# Patient Record
Sex: MALE | Race: OTHER | NOT HISPANIC OR LATINO | URBAN - METROPOLITAN AREA
[De-identification: names, ages, dates, MRNs, and addresses within clinical notes are randomized per-mention and may not be internally consistent; named-entity substitution may affect disease eponyms.]

---

## 2022-07-20 ENCOUNTER — INPATIENT (INPATIENT)
Facility: HOSPITAL | Age: 67
LOS: 0 days | Discharge: HOME | DRG: 314 | End: 2022-07-21
Attending: HOSPITALIST | Admitting: INTERNAL MEDICINE
Payer: MEDICAID

## 2022-07-20 VITALS
TEMPERATURE: 98 F | HEIGHT: 67 IN | OXYGEN SATURATION: 95 % | RESPIRATION RATE: 17 BRPM | HEART RATE: 61 BPM | DIASTOLIC BLOOD PRESSURE: 89 MMHG | SYSTOLIC BLOOD PRESSURE: 156 MMHG | WEIGHT: 191.8 LBS

## 2022-07-20 DIAGNOSIS — I10 ESSENTIAL (PRIMARY) HYPERTENSION: ICD-10-CM

## 2022-07-20 DIAGNOSIS — N17.9 ACUTE KIDNEY FAILURE, UNSPECIFIED: ICD-10-CM

## 2022-07-20 DIAGNOSIS — I21.4 NON-ST ELEVATION (NSTEMI) MYOCARDIAL INFARCTION: ICD-10-CM

## 2022-07-20 DIAGNOSIS — Z79.899 OTHER LONG TERM (CURRENT) DRUG THERAPY: ICD-10-CM

## 2022-07-20 DIAGNOSIS — H40.9 UNSPECIFIED GLAUCOMA: ICD-10-CM

## 2022-07-20 DIAGNOSIS — E11.9 TYPE 2 DIABETES MELLITUS WITHOUT COMPLICATIONS: ICD-10-CM

## 2022-07-20 DIAGNOSIS — U07.1 COVID-19: ICD-10-CM

## 2022-07-20 DIAGNOSIS — R07.9 CHEST PAIN, UNSPECIFIED: ICD-10-CM

## 2022-07-20 DIAGNOSIS — I25.10 ATHEROSCLEROTIC HEART DISEASE OF NATIVE CORONARY ARTERY WITHOUT ANGINA PECTORIS: ICD-10-CM

## 2022-07-20 DIAGNOSIS — Z29.9 ENCOUNTER FOR PROPHYLACTIC MEASURES, UNSPECIFIED: ICD-10-CM

## 2022-07-20 LAB
ALBUMIN SERPL ELPH-MCNC: 3.8 G/DL — SIGNIFICANT CHANGE UP (ref 3.3–5)
ALP SERPL-CCNC: 52 U/L — SIGNIFICANT CHANGE UP (ref 40–120)
ALT FLD-CCNC: 12 U/L — SIGNIFICANT CHANGE UP (ref 10–45)
ANION GAP SERPL CALC-SCNC: 9 MMOL/L — SIGNIFICANT CHANGE UP (ref 5–17)
ANION GAP SERPL CALC-SCNC: 9 MMOL/L — SIGNIFICANT CHANGE UP (ref 5–17)
AST SERPL-CCNC: 36 U/L — SIGNIFICANT CHANGE UP (ref 10–40)
BASE EXCESS BLDV CALC-SCNC: 3.3 MMOL/L — HIGH (ref -2–2)
BILIRUB SERPL-MCNC: 0.3 MG/DL — SIGNIFICANT CHANGE UP (ref 0.2–1.2)
BUN SERPL-MCNC: 19 MG/DL — SIGNIFICANT CHANGE UP (ref 7–23)
BUN SERPL-MCNC: 20 MG/DL — SIGNIFICANT CHANGE UP (ref 7–23)
CA-I SERPL-SCNC: 1.2 MMOL/L — SIGNIFICANT CHANGE UP (ref 1.15–1.33)
CALCIUM SERPL-MCNC: 8.8 MG/DL — SIGNIFICANT CHANGE UP (ref 8.4–10.5)
CALCIUM SERPL-MCNC: 9.4 MG/DL — SIGNIFICANT CHANGE UP (ref 8.4–10.5)
CHLORIDE BLDV-SCNC: 105 MMOL/L — SIGNIFICANT CHANGE UP (ref 96–108)
CHLORIDE SERPL-SCNC: 103 MMOL/L — SIGNIFICANT CHANGE UP (ref 96–108)
CHLORIDE SERPL-SCNC: 104 MMOL/L — SIGNIFICANT CHANGE UP (ref 96–108)
CO2 BLDV-SCNC: 28 MMOL/L — HIGH (ref 22–26)
CO2 SERPL-SCNC: 24 MMOL/L — SIGNIFICANT CHANGE UP (ref 22–31)
CO2 SERPL-SCNC: 24 MMOL/L — SIGNIFICANT CHANGE UP (ref 22–31)
CREAT SERPL-MCNC: 1.42 MG/DL — HIGH (ref 0.5–1.3)
CREAT SERPL-MCNC: 1.45 MG/DL — HIGH (ref 0.5–1.3)
EGFR: 52 ML/MIN/1.73M2 — LOW
EGFR: 53 ML/MIN/1.73M2 — LOW
GAS PNL BLDV: 136 MMOL/L — SIGNIFICANT CHANGE UP (ref 136–145)
GAS PNL BLDV: SIGNIFICANT CHANGE UP
GAS PNL BLDV: SIGNIFICANT CHANGE UP
GLUCOSE BLDC GLUCOMTR-MCNC: 101 MG/DL — HIGH (ref 70–99)
GLUCOSE BLDC GLUCOMTR-MCNC: 102 MG/DL — HIGH (ref 70–99)
GLUCOSE BLDC GLUCOMTR-MCNC: 139 MG/DL — HIGH (ref 70–99)
GLUCOSE BLDV-MCNC: 98 MG/DL — SIGNIFICANT CHANGE UP (ref 70–99)
GLUCOSE SERPL-MCNC: 108 MG/DL — HIGH (ref 70–99)
GLUCOSE SERPL-MCNC: 96 MG/DL — SIGNIFICANT CHANGE UP (ref 70–99)
HCO3 BLDV-SCNC: 27 MMOL/L — SIGNIFICANT CHANGE UP (ref 22–29)
HCT VFR BLD CALC: 35.3 % — LOW (ref 39–50)
HCT VFR BLDA CALC: 33 % — LOW (ref 39–51)
HGB BLD CALC-MCNC: 11 G/DL — LOW (ref 12.6–17.4)
HGB BLD-MCNC: 11.6 G/DL — LOW (ref 13–17)
LACTATE BLDV-MCNC: 0.9 MMOL/L — SIGNIFICANT CHANGE UP (ref 0.7–2)
MAGNESIUM SERPL-MCNC: 2 MG/DL — SIGNIFICANT CHANGE UP (ref 1.6–2.6)
MCHC RBC-ENTMCNC: 25.3 PG — LOW (ref 27–34)
MCHC RBC-ENTMCNC: 32.9 GM/DL — SIGNIFICANT CHANGE UP (ref 32–36)
MCV RBC AUTO: 76.9 FL — LOW (ref 80–100)
NRBC # BLD: 0 /100 WBCS — SIGNIFICANT CHANGE UP (ref 0–0)
PCO2 BLDV: 36 MMHG — LOW (ref 42–55)
PH BLDV: 7.48 — HIGH (ref 7.32–7.43)
PHOSPHATE SERPL-MCNC: 3.1 MG/DL — SIGNIFICANT CHANGE UP (ref 2.5–4.5)
PLATELET # BLD AUTO: 169 K/UL — SIGNIFICANT CHANGE UP (ref 150–400)
PO2 BLDV: 201 MMHG — HIGH (ref 25–45)
POTASSIUM BLDV-SCNC: 3.7 MMOL/L — SIGNIFICANT CHANGE UP (ref 3.5–5.1)
POTASSIUM SERPL-MCNC: 5.2 MMOL/L — SIGNIFICANT CHANGE UP (ref 3.5–5.3)
POTASSIUM SERPL-MCNC: 6.1 MMOL/L — HIGH (ref 3.5–5.3)
POTASSIUM SERPL-SCNC: 5.2 MMOL/L — SIGNIFICANT CHANGE UP (ref 3.5–5.3)
POTASSIUM SERPL-SCNC: 6.1 MMOL/L — HIGH (ref 3.5–5.3)
PROT SERPL-MCNC: 7.7 G/DL — SIGNIFICANT CHANGE UP (ref 6–8.3)
RBC # BLD: 4.59 M/UL — SIGNIFICANT CHANGE UP (ref 4.2–5.8)
RBC # FLD: 14 % — SIGNIFICANT CHANGE UP (ref 10.3–14.5)
SAO2 % BLDV: 99.1 % — HIGH (ref 67–88)
SARS-COV-2 RNA SPEC QL NAA+PROBE: DETECTED
SARS-COV-2 RNA SPEC QL NAA+PROBE: DETECTED
SODIUM SERPL-SCNC: 136 MMOL/L — SIGNIFICANT CHANGE UP (ref 135–145)
SODIUM SERPL-SCNC: 137 MMOL/L — SIGNIFICANT CHANGE UP (ref 135–145)
TROPONIN T, HIGH SENSITIVITY RESULT: 10 NG/L — SIGNIFICANT CHANGE UP (ref 0–51)
WBC # BLD: 6.79 K/UL — SIGNIFICANT CHANGE UP (ref 3.8–10.5)
WBC # FLD AUTO: 6.79 K/UL — SIGNIFICANT CHANGE UP (ref 3.8–10.5)

## 2022-07-20 PROCEDURE — 93306 TTE W/DOPPLER COMPLETE: CPT | Mod: 26

## 2022-07-20 PROCEDURE — 99223 1ST HOSP IP/OBS HIGH 75: CPT

## 2022-07-20 PROCEDURE — 93010 ELECTROCARDIOGRAM REPORT: CPT

## 2022-07-20 PROCEDURE — 71045 X-RAY EXAM CHEST 1 VIEW: CPT | Mod: 26

## 2022-07-20 PROCEDURE — 99255 IP/OBS CONSLTJ NEW/EST HI 80: CPT

## 2022-07-20 RX ORDER — HEPARIN SODIUM 5000 [USP'U]/ML
5000 INJECTION INTRAVENOUS; SUBCUTANEOUS EVERY 8 HOURS
Refills: 0 | Status: DISCONTINUED | OUTPATIENT
Start: 2022-07-20 | End: 2022-07-21

## 2022-07-20 RX ORDER — ASPIRIN/CALCIUM CARB/MAGNESIUM 324 MG
81 TABLET ORAL DAILY
Refills: 0 | Status: DISCONTINUED | OUTPATIENT
Start: 2022-07-20 | End: 2022-07-21

## 2022-07-20 RX ORDER — SODIUM CHLORIDE 9 MG/ML
500 INJECTION, SOLUTION INTRAVENOUS
Refills: 0 | Status: DISCONTINUED | OUTPATIENT
Start: 2022-07-20 | End: 2022-07-21

## 2022-07-20 RX ORDER — ACETAMINOPHEN 500 MG
650 TABLET ORAL EVERY 6 HOURS
Refills: 0 | Status: DISCONTINUED | OUTPATIENT
Start: 2022-07-20 | End: 2022-07-21

## 2022-07-20 RX ORDER — DEXTROSE 50 % IN WATER 50 %
15 SYRINGE (ML) INTRAVENOUS ONCE
Refills: 0 | Status: DISCONTINUED | OUTPATIENT
Start: 2022-07-20 | End: 2022-07-21

## 2022-07-20 RX ORDER — GLUCAGON INJECTION, SOLUTION 0.5 MG/.1ML
1 INJECTION, SOLUTION SUBCUTANEOUS ONCE
Refills: 0 | Status: DISCONTINUED | OUTPATIENT
Start: 2022-07-20 | End: 2022-07-21

## 2022-07-20 RX ORDER — SODIUM CHLORIDE 9 MG/ML
1000 INJECTION, SOLUTION INTRAVENOUS
Refills: 0 | Status: DISCONTINUED | OUTPATIENT
Start: 2022-07-20 | End: 2022-07-21

## 2022-07-20 RX ORDER — TIMOLOL 0.5 %
1 DROPS OPHTHALMIC (EYE)
Refills: 0 | Status: DISCONTINUED | OUTPATIENT
Start: 2022-07-20 | End: 2022-07-21

## 2022-07-20 RX ORDER — METOPROLOL TARTRATE 50 MG
25 TABLET ORAL DAILY
Refills: 0 | Status: DISCONTINUED | OUTPATIENT
Start: 2022-07-20 | End: 2022-07-21

## 2022-07-20 RX ORDER — ENOXAPARIN SODIUM 100 MG/ML
40 INJECTION SUBCUTANEOUS EVERY 24 HOURS
Refills: 0 | Status: DISCONTINUED | OUTPATIENT
Start: 2022-07-20 | End: 2022-07-20

## 2022-07-20 RX ORDER — INSULIN LISPRO 100/ML
VIAL (ML) SUBCUTANEOUS
Refills: 0 | Status: DISCONTINUED | OUTPATIENT
Start: 2022-07-20 | End: 2022-07-21

## 2022-07-20 RX ORDER — PANTOPRAZOLE SODIUM 20 MG/1
40 TABLET, DELAYED RELEASE ORAL
Refills: 0 | Status: DISCONTINUED | OUTPATIENT
Start: 2022-07-20 | End: 2022-07-21

## 2022-07-20 RX ORDER — DEXTROSE 50 % IN WATER 50 %
25 SYRINGE (ML) INTRAVENOUS ONCE
Refills: 0 | Status: DISCONTINUED | OUTPATIENT
Start: 2022-07-20 | End: 2022-07-21

## 2022-07-20 RX ORDER — ATORVASTATIN CALCIUM 80 MG/1
80 TABLET, FILM COATED ORAL AT BEDTIME
Refills: 0 | Status: DISCONTINUED | OUTPATIENT
Start: 2022-07-20 | End: 2022-07-21

## 2022-07-20 RX ORDER — DEXTROSE 50 % IN WATER 50 %
12.5 SYRINGE (ML) INTRAVENOUS ONCE
Refills: 0 | Status: DISCONTINUED | OUTPATIENT
Start: 2022-07-20 | End: 2022-07-21

## 2022-07-20 RX ORDER — INSULIN LISPRO 100/ML
VIAL (ML) SUBCUTANEOUS AT BEDTIME
Refills: 0 | Status: DISCONTINUED | OUTPATIENT
Start: 2022-07-20 | End: 2022-07-21

## 2022-07-20 RX ADMIN — Medication 1 DROP(S): at 22:37

## 2022-07-20 RX ADMIN — HEPARIN SODIUM 5000 UNIT(S): 5000 INJECTION INTRAVENOUS; SUBCUTANEOUS at 22:10

## 2022-07-20 RX ADMIN — Medication 1 DROP(S): at 22:54

## 2022-07-20 RX ADMIN — SODIUM CHLORIDE 100 MILLILITER(S): 9 INJECTION, SOLUTION INTRAVENOUS at 18:55

## 2022-07-20 RX ADMIN — ATORVASTATIN CALCIUM 80 MILLIGRAM(S): 80 TABLET, FILM COATED ORAL at 22:10

## 2022-07-20 NOTE — H&P ADULT - ASSESSMENT
66yo M w/ PMHx HTN, HLD, T2DM, CAD presenting with chest pain requiring cards eval and eventual LHC, however found to be COVID+.

## 2022-07-20 NOTE — H&P ADULT - PROBLEM SELECTOR PLAN 1
-Only on exertion, resolves with rest, concern for stable angina however echo at [facility] concerning for hypertrophic cardiomyopathy  -Repeat Echo performed here; showed basal septal hypertrophy, preserved EF, normal LV function, mild diastolic dysfunction  -normal trops, EKG shower TWI in lateral leads; pt w/ known hx of prior silent infarct  -Cardiology following for possible cath, appreciate recs  -c/w ASA, high intensity statin, BB

## 2022-07-20 NOTE — H&P ADULT - PROBLEM SELECTOR PLAN 4
-per pt, was told he had a silent MI in the past, no hx of PCI  -c/w ASA, statin as above  -lipid panel ordered for AM as no baseline

## 2022-07-20 NOTE — H&P ADULT - PROBLEM SELECTOR PLAN 6
-BP on arrival to room was 156/91  -Ideally would start ACE/ARB however i/s/o current ORACIO would hold off until improved  -Continue to trend BPs

## 2022-07-20 NOTE — H&P ADULT - NSHPREVIEWOFSYSTEMS_GEN_ALL_CORE
Review of Systems:   CONSTITUTIONAL: No fever, weight loss  EYES: No eye pain, visual disturbances, or discharge  ENMT:  No difficulty hearing, tinnitus, vertigo; No sinus or throat pain  RESPIRATORY: No SOB. No cough, wheezing, chills or hemoptysis  CARDIOVASCULAR: + chest pain, palpitations, no dizziness, or leg swelling  GASTROINTESTINAL: No abdominal or epigastric pain. No nausea, vomiting, or hematemesis; No diarrhea or constipation. No melena or hematochezia.  GENITOURINARY: No dysuria, frequency, hematuria, or incontinence  NEUROLOGICAL: No headaches, memory loss, loss of strength, numbness, or tremors  SKIN: No itching, burning, rashes, or lesions   ENDOCRINE: No heat or cold intolerance; No hair loss  MUSCULOSKELETAL: No joint pain or swelling; No muscle, back pain  PSYCHIATRIC: No depression, anxiety, mood swings, or difficulty sleeping  HEME/LYMPH: No easy bruising, or bleeding gums

## 2022-07-20 NOTE — CONSULT NOTE ADULT - SUBJECTIVE AND OBJECTIVE BOX
Patient seen and evaluated at bedside    Chief Complaint: Chest pain    HPI:  This is a 68yo Male with PMHx of HTN, HLD, T2DM, CAD, presented to [facility] on 7/12 complaining of chest pain. Reports one year of intermittent chest pain that starts in his epigastric region and radiates up to his med sternum and mid chest area. Occurs with exertion, improves with rest. Denies fevers, chills, shortness of breath, orthopnea, PND sxs, LE edema. TTE per report at [place] showed hypertrophic cardiomyopathy. Transferred here for further workup. Incidentally found to be Covid-19 positive here. hsTrop negative. Cr 1.4, unclear baseline. Currently pain free. EKG sinus with TWIs I, aVL, V2-V6.      PMHx:   HTN (hypertension)  HLD (hyperlipidemia)  CAD (coronary artery disease)  Type 2 diabetes mellitus      Allergies:  No Known Allergies      Current Medications:   acetaminophen     Tablet .. 650 milliGRAM(s) Oral every 6 hours PRN  artificial tears (preservative free) Ophthalmic Solution 1 Drop(s) Both EYES three times a day  aspirin enteric coated 81 milliGRAM(s) Oral daily  atorvastatin 80 milliGRAM(s) Oral at bedtime  dextrose 5%. 1000 milliLiter(s) IV Continuous <Continuous>  dextrose 5%. 1000 milliLiter(s) IV Continuous <Continuous>  dextrose 50% Injectable 25 Gram(s) IV Push once  dextrose 50% Injectable 12.5 Gram(s) IV Push once  dextrose 50% Injectable 25 Gram(s) IV Push once  dextrose Oral Gel 15 Gram(s) Oral once PRN  glucagon  Injectable 1 milliGRAM(s) IntraMuscular once  heparin   Injectable 5000 Unit(s) SubCutaneous every 8 hours  insulin lispro (ADMELOG) corrective regimen sliding scale   SubCutaneous three times a day before meals  insulin lispro (ADMELOG) corrective regimen sliding scale   SubCutaneous at bedtime  metoprolol succinate ER 25 milliGRAM(s) Oral daily  pantoprazole    Tablet 40 milliGRAM(s) Oral before breakfast  timolol 0.5% Solution 1 Drop(s) Both EYES two times a day      REVIEW OF SYSTEMS:  Constitutional:     [x ] negative [ ] fevers [ ] chills [ ] weight loss [ ] weight gain  HEENT:                  [x ] negative [ ] dry eyes [ ] eye irritation [ ] postnasal drip [ ] nasal congestion  CV:                         [ ] negative  [x ] chest pain [ ] orthopnea [ ] palpitations [ ] murmur  Resp:                     [x ] negative [ ] cough [ ] shortness of breath [ ] dyspnea [ ] wheezing [ ] sputum [ ]hemoptysis  GI:                          [ x] negative [ ] nausea [ ] vomiting [ ] diarrhea [ ] constipation [ ] abd pain [ ] dysphagia   :                        [ x] negative [ ] dysuria [ ] nocturia [ ] hematuria [ ] increased urinary frequency  Musculoskeletal: [x ] negative [ ] back pain [ ] myalgias [ ] arthralgias [ ] fracture  Skin:                       [ x] negative [ ] rash [ ] itch  Neurological:        [ x] negative [ ] headache [ ] dizziness [ ] syncope [ ] weakness [ ] numbness  Psychiatric:           [ x] negative [ ] anxiety [ ] depression  Endocrine:            [ x] negative [ ] diabetes [ ] thyroid problem  Heme/Lymph:      [ x] negative [ ] anemia [ ] bleeding problem  Allergic/Immune: [ x] negative [ ] itchy eyes [ ] nasal discharge [ ] hives [ ] angioedema    [ x] All other systems negative  [ ] Unable to assess ROS due to      Physical Exam:  T(F): 98.2 (07-20), Max: 98.2 (07-20)  HR: 59 (07-20) (59 - 61)  BP: 169/81 (07-20) (156/89 - 169/81)  RR: 16 (07-20)  SpO2: 97% (07-20)  GENERAL: No acute distress, well-developed  HEAD:  Atraumatic, Normocephalic  ENT: EOMI, conjunctiva and sclera clear, Neck supple, No JVD, moist mucosa  CHEST/LUNG: Clear to auscultation bilaterally; No wheeze, equal breath sounds bilaterally   HEART: Regular rate and rhythm; No murmurs, rubs, or gallops  ABDOMEN: Soft, Nontender, Nondistended; Bowel sounds present  EXTREMITIES:  No clubbing, cyanosis, or edema  PSYCH: Nl behavior, nl affect  NEUROLOGY: AAOx3, non-focal, cranial nerves intact  SKIN: Normal color, No rashes or lesions    Cardiovascular Diagnostic Testing:    ECG: Personally reviewed:  Sinus with TWI I, aVL, V2-V6    Imaging:    CXR: Personally reviewed    Labs: Personally reviewed                        11.6   6.79  )-----------( 169      ( 20 Jul 2022 12:41 )             35.3     07-20    136  |  103  |  19  ----------------------------<  96  6.1<H>   |  24  |  1.45<H>    Ca    9.4      20 Jul 2022 12:41

## 2022-07-20 NOTE — H&P ADULT - NSHPLABSRESULTS_GEN_ALL_CORE
EKG personally reviewed by me, NSR, TWI noted in lateral leads  CXR personally reviewed by me and discussed with radiology: rotated film, clear lungs-no consolidations or GGOs

## 2022-07-20 NOTE — H&P ADULT - HISTORY OF PRESENT ILLNESS
67M w/ PMHx HTN, HLD, T2DM, CAD, presented to [facility] on 7/12 complaining of chest pain.  67M w/ PMHx HTN, HLD, T2DM, CAD, presented to [facility] on 7/12 complaining of chest pain. Per patient, he had been experiencing chest pain/pressure for the past year with exertion that would resolve with rest. He endorses associated shortness of breath and palpitations. Of note, patient reports that [place] and was told he had a minor heart attack during [place] annual [place], no intervention was done and patient was told to take an aspirin. Patient was initially was admitted to [facility] on 7/12/22 where he was started on ASA, metoprolol, statin and echo was obtained. Echo was consistent with hypertrophic cardiomyopathy (no official report in transfer paperwork) and patient was transferred to [facility] for catheterization. While at [facility], patient was found to be COVID (+), last covid test on 7/19 was negative per transfer records. Cath was deferred and patient admitted to medicine. Patient has been afebrile, hemodynamically stable, no O2 requirement and asymptomatic.  67M w/ PMHx HTN, HLD, T2DM, glaucoma, CAD, presented to [facility] on 7/12 complaining of chest pain. Per patient, he had been experiencing chest pain/pressure for the past year with exertion that would resolve with rest. He endorses associated shortness of breath and palpitations. Of note, patient reports that [place] and was told he had a minor heart attack during [place], no intervention was done and patient was told to take an aspirin. Patient was initially was admitted to [facility] on 7/12/22 where he was started on ASA, metoprolol, statin and echo was obtained. Echo was consistent with hypertrophic cardiomyopathy (no official report in transfer paperwork) and patient was transferred to [facility] for catheterization. While at [facility], patient was found to be COVID (+), last covid test on 7/19 was negative per transfer records. Cath was deferred and patient admitted to medicine. Patient has been afebrile, hemodynamically stable, no O2 requirement and asymptomatic.

## 2022-07-20 NOTE — H&P CARDIOLOGY - GASTROINTESTINAL
[facility] Preoperative Instructions Surgery Date 02/11/19         Time of Arrival to be called with time Contact # [phone] cell 1. On the day of your surgery, please report to the Surgical Services Registration Desk and sign in at your designated time. The Surgery Center is located to the right of the Emergency Room. 2. You must have someone with you to drive you home. You should not drive a car for 24 hours following surgery. Please make arrangements for a friend or family member to stay with you for the first 24 hours after your surgery. 3. Do not have anything to eat or drink (including water, gum, mints, coffee, juice) after midnight ??02/10/19 . ? This may not apply to medications prescribed by your physician. ?(Please note below the special instructions with medications to take the morning of your procedure.) 4. We recommend you do not drink any alcoholic beverages for 24 hours before and after your surgery. 5. Contact your surgeons office for instructions on the following medications: non-steroidal anti-inflammatory drugs (i.e. Advil, Aleve), vitamins, and supplements. (Some surgeons will want you to stop these medications prior to surgery and others may allow you to take them) **If you are currently taking Plavix, Coumadin, Aspirin and/or other blood-thinning agents, contact your surgeon for instructions. ** Your surgeon will partner with the physician prescribing these medications to determine if it is safe to stop or if you need to continue taking. Please do not stop taking these medications without instructions from your surgeon 6. Wear comfortable clothes. Wear glasses instead of contacts. Do not bring any money or jewelry. Please bring picture ID, insurance card, and any prearranged co-payment or hospital payment. Do not wear make-up, particularly mascara the morning of your surgery.   Do not wear nail polish, particularly if you are having foot /hand surgery. Wear your hair loose or down, no ponytails, buns, carline pins or clips. All body piercings must be removed. Please shower with antibacterial soap for three consecutive days before and on the morning of surgery, but do not apply any lotions, powders or deodorants after the shower on the day of surgery. Please use a fresh towels after each shower. Please sleep in clean clothes and change bed linens the night before surgery. Please do not shave for 48 hours prior to surgery. Shaving of the face is acceptable. 7. You should understand that if you do not follow these instructions your surgery may be cancelled. If your physical condition changes (I.e. fever, cold or flu) please contact your surgeon as soon as possible. 8. It is important that you be on time. If a situation occurs where you may be late, please call [phone] (OR Holding Area). 9. If you have any questions and or problems, please call [phone] (Pre-admission Testing). 10. Your surgery time may be subject to change. You will receive a phone call the evening prior if your time changes. 11.  If having outpatient surgery, you must have someone to drive you here, stay with you during the duration of your stay, and to drive you home at time of discharge. 12.   In an effort to improve the efficiency, privacy, and safety for all of our Pre-op patients visitors are not allowed in the Holding area. Once you arrive and are registered your family/visitors will be asked to remain in the waiting room. The Pre-op staff will get you from the Surgical Waiting Area and will explain to you and your family/visitors that the Pre-op phase is beginning. The staff will answer any questions and provide instructions for tracking of the patient, by use of the existing tracking number and color-coded status board in the waiting room.   At this time the staff will also ask for your designated spokesperson information in the event that the physician or staff need to provide an update or obtain any pertinent information. The designated spokesperson will be notified if the physician needs to speak to family during the pre-operative phase. If at any time your family/visitors has questions or concerns they may approach the volunteer desk in the waiting area for assistance. Special Instructions:Practice with incentive spirometry---please bring incentive spirometry back to the hospital for use MEDICATIONS TO TAKE THE MORNING OF SURGERY WITH A SIP OF WATER:none I understand a pre-operative phone call will be made to verify my surgery time. In the event that I am not available, I give permission for a message to be left on my answering service and/or with another person? yes  
 
 
 
 ___________________      __________   _________ 
  (Signature of Patient)             (Witness)                (Date and Time) Soft, non-tender, no hepatosplenomegaly, normal bowel sounds

## 2022-07-20 NOTE — CONSULT NOTE ADULT - ASSESSMENT
This is a 68yo Male with PMHx of HTN, HLD, T2DM, CAD, presented to [facility] on 7/12 complaining of exertional chest pain for one year. TTE there with possible HCM, unable to see. Transferred for ischemic workup, incidentally found to be Covid positive. hsTrop negative. EKG sinus with TWIs I, aVL, V2-V6.    1. Chest Pain  - Monitor clinically and repeat EKG if recurrence of pain  - Obtain TTE here  - Aspirin 81mg, Atorvastatin 80mg qHS  - Metoprolol Succinate 25mg daily  - Monitor on Telemetry  - Further ischemic workup pending above    2. Covid-19 positive  - Asymptomatic at this time       Benoit Ureña MD  Cardiology Fellow - PGY 5  Text or Call: [phone]  For all New Consults and Questions:  [email or web address]   Login: [email or web address]

## 2022-07-20 NOTE — H&P ADULT - PROBLEM SELECTOR PLAN 8
-Patient does not know which medications he takes at home, will have someone bring in full list tomorrow

## 2022-07-20 NOTE — H&P ADULT - NSHPPHYSICALEXAM_GEN_ALL_CORE
Vital Signs Last 24 Hrs  T(C): 36.8 (20 Jul 2022 12:49), Max: 36.8 (20 Jul 2022 12:49)  T(F): 98.2 (20 Jul 2022 12:49), Max: 98.2 (20 Jul 2022 12:49)  HR: 59 (20 Jul 2022 13:45) (59 - 61)  BP: 169/81 (20 Jul 2022 13:45) (156/89 - 169/81)  BP(mean): 101 (20 Jul 2022 13:45) (101 - 101)  RR: 16 (20 Jul 2022 13:45) (16 - 17)  SpO2: 97% (20 Jul 2022 13:45) (95% - 97%)    Parameters below as of 20 Jul 2022 13:45  Patient On (Oxygen Delivery Method): room air        CONSTITUTIONAL: Well-groomed, in no apparent distress  EYES: No conjunctival or scleral injection, non-icteric; PERRLA and symmetric  ENMT: No external nasal lesions; nasal mucosa not inflamed; normal dentition; no pharyngeal injection or exudates, oral mucosa with moist membranes  NECK: Trachea midline without palpable neck mass; thyroid not enlarged and non-tender  RESPIRATORY: Breathing comfortably; no dullness to percussion; lungs CTA without wheeze/rhonchi/rales  CARDIOVASCULAR: +S1S2, RRR, no M/G/R; no carotid bruits; pedal pulses full and symmetric; no lower extremity edema  GASTROINTESTINAL: No palpable masses or tenderness, +BS throughout, no rebound/guarding; no hepatosplenomegaly; no hernia palpated  LYMPHATIC: No cervical LAD or tenderness; no axillary LAD or tenderness  MUSCULOSKELETAL: No digital clubbing or cyanosis; no paraspinal tenderness; able to move all 4 extremities; strength 5/5 in all extremities.   SKIN: No rashes or ulcers noted; no subcutaneous nodules or induration palpable  NEUROLOGIC: CN II-XII intact; normal reflexes in upper and lower extremities; sensation intact in LEs b/l to light touch  PSYCHIATRIC: A+O x 3; mood and affect appropriate; appropriate insight and judgment

## 2022-07-20 NOTE — PATIENT PROFILE ADULT - FALL HARM RISK - UNIVERSAL INTERVENTIONS
Verbalized Understanding/Simple: Patient demonstrates quick and easy understanding
Bed in lowest position, wheels locked, appropriate side rails in place/Call bell, personal items and telephone in reach/Instruct patient to call for assistance before getting out of bed or chair/Non-slip footwear when patient is out of bed/[place] to call system/Physically safe environment - no spills, clutter or unnecessary equipment/Purposeful Proactive Rounding/Room/bathroom lighting operational, light cord in reach

## 2022-07-20 NOTE — H&P ADULT - PROBLEM SELECTOR PLAN 3
-Cr 1.45 with no known hx of CKD, normal Cr documented in transfer paperwork  -Will give 500 cc over 5 hours for gentle hydration   -f/u repeat Cr and continue to trend  -Pt noted to be hyperkalemic on labs however severe hemolysis, will repeat K

## 2022-07-20 NOTE — H&P CARDIOLOGY - NSICDXPASTMEDICALHX_GEN_ALL_CORE_FT
PAST MEDICAL HISTORY:  CAD (coronary artery disease)     HLD (hyperlipidemia)     HTN (hypertension)     Type 2 diabetes mellitus

## 2022-07-20 NOTE — H&P CARDIOLOGY - REVIEW OF SYSTEMS

## 2022-07-20 NOTE — H&P ADULT - NSHPSOCIALHISTORY_GEN_ALL_CORE
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 Former drinker--last drink was 2 years ago, no smoking hx, no drug use.

## 2022-07-20 NOTE — H&P CARDIOLOGY - HISTORY OF PRESENT ILLNESS
67 yr old male with PMH of HTN, HLD, Type 2 DM, CAD, presented to [facility] 7/12/22 complaining of chest pain for over one year; describes it as "burning", originating in epigastric region and radiating up towards the sternum. It only occurs with exertion. He reports associated shortness of breath and palpitations. All symptoms resolve with rest. Denies LE swelling, orthopnea. TTE showed hypertrophic cardiomyopathy (no echo report in transfer paperwork). Patient transferred to [facility] for cath for further workup.   [facility] labs show elevated creatinine, GFR <60; labs sent upon arrival to [facility].  67 yr old male with PMH of HTN, HLD, Type 2 DM, CAD, presented to [facility] 7/12/22 complaining of chest pain for over one year; describes it as "burning", originating in epigastric region and radiating up towards the sternum. It only occurs with exertion. He reports associated shortness of breath and palpitations. All symptoms resolve with rest. Denies LE swelling, orthopnea. TTE showed hypertrophic cardiomyopathy (no echo report in transfer paperwork). Patient transferred to [facility] for cath for further workup.   [facility] labs show elevated creatinine, GFR <60; labs sent upon arrival to [facility].     PMD Dr Ene Peters, [facility] ([facility]) 67 yr old  male with PMH of HTN, HLD, Type 2 DM (Hgba1c unknown, pt follows Dr Peters in [facility]) , CAD, presented to [facility] 7/12/22 complaining of chest pain for over one year; describes it as "burning", originating in epigastric region and radiating up towards the sternum. It only occurs with exertion. He reports associated shortness of breath and palpitations. All symptoms resolve with rest. Denies LE swelling, orthopnea. TTE showed hypertrophic cardiomyopathy (no echo report in transfer paperwork). Patient transferred to [facility] for cath for further workup.   [facility] labs show elevated creatinine, GFR <60; [facility]     PMD Dr Ene Peters, [facility] ([facility])

## 2022-07-20 NOTE — H&P ADULT - PROBLEM SELECTOR PLAN 2
-Positive COVID PCR on 7/20; per transfer paperwork patient had a negative covid test on 7/19 at [facility]  -Patient asymptomatic, afebrile, not requiring any O2, clear CXR  -Tylenol PRN for fevers  -Continue to monitor

## 2022-07-21 ENCOUNTER — TRANSCRIPTION ENCOUNTER (OUTPATIENT)
Age: 67
End: 2022-07-21

## 2022-07-21 VITALS
OXYGEN SATURATION: 97 % | DIASTOLIC BLOOD PRESSURE: 72 MMHG | HEART RATE: 60 BPM | SYSTOLIC BLOOD PRESSURE: 166 MMHG | RESPIRATION RATE: 18 BRPM | TEMPERATURE: 97 F

## 2022-07-21 LAB
A1C WITH ESTIMATED AVERAGE GLUCOSE RESULT: 5.8 % — HIGH (ref 4–5.6)
A1C WITH ESTIMATED AVERAGE GLUCOSE RESULT: 5.8 % — HIGH (ref 4–5.6)
ALBUMIN SERPL ELPH-MCNC: 3.8 G/DL — SIGNIFICANT CHANGE UP (ref 3.3–5)
ALP SERPL-CCNC: 59 U/L — SIGNIFICANT CHANGE UP (ref 40–120)
ALT FLD-CCNC: 11 U/L — SIGNIFICANT CHANGE UP (ref 10–45)
ANION GAP SERPL CALC-SCNC: 8 MMOL/L — SIGNIFICANT CHANGE UP (ref 5–17)
AST SERPL-CCNC: 12 U/L — SIGNIFICANT CHANGE UP (ref 10–40)
BILIRUB SERPL-MCNC: 0.3 MG/DL — SIGNIFICANT CHANGE UP (ref 0.2–1.2)
BUN SERPL-MCNC: 19 MG/DL — SIGNIFICANT CHANGE UP (ref 7–23)
CALCIUM SERPL-MCNC: 8.8 MG/DL — SIGNIFICANT CHANGE UP (ref 8.4–10.5)
CHLORIDE SERPL-SCNC: 106 MMOL/L — SIGNIFICANT CHANGE UP (ref 96–108)
CHOLEST SERPL-MCNC: 121 MG/DL — SIGNIFICANT CHANGE UP
CO2 SERPL-SCNC: 28 MMOL/L — SIGNIFICANT CHANGE UP (ref 22–31)
CREAT SERPL-MCNC: 1.51 MG/DL — HIGH (ref 0.5–1.3)
CRP SERPL-MCNC: <3 MG/L — SIGNIFICANT CHANGE UP (ref 0–4)
D DIMER BLD IA.RAPID-MCNC: <150 NG/ML DDU — SIGNIFICANT CHANGE UP
EGFR: 50 ML/MIN/1.73M2 — LOW
ESTIMATED AVERAGE GLUCOSE: 120 MG/DL — HIGH (ref 68–114)
ESTIMATED AVERAGE GLUCOSE: 120 MG/DL — HIGH (ref 68–114)
FERRITIN SERPL-MCNC: 44 NG/ML — SIGNIFICANT CHANGE UP (ref 30–400)
GLUCOSE BLDC GLUCOMTR-MCNC: 103 MG/DL — HIGH (ref 70–99)
GLUCOSE BLDC GLUCOMTR-MCNC: 105 MG/DL — HIGH (ref 70–99)
GLUCOSE SERPL-MCNC: 96 MG/DL — SIGNIFICANT CHANGE UP (ref 70–99)
HCT VFR BLD CALC: 35.8 % — LOW (ref 39–50)
HCV AB S/CO SERPL IA: 0.27 S/CO — SIGNIFICANT CHANGE UP (ref 0–0.99)
HCV AB SERPL-IMP: SIGNIFICANT CHANGE UP
HDLC SERPL-MCNC: 32 MG/DL — LOW
HGB BLD-MCNC: 11.4 G/DL — LOW (ref 13–17)
LIPID PNL WITH DIRECT LDL SERPL: 67 MG/DL — SIGNIFICANT CHANGE UP
MCHC RBC-ENTMCNC: 25.7 PG — LOW (ref 27–34)
MCHC RBC-ENTMCNC: 31.8 GM/DL — LOW (ref 32–36)
MCV RBC AUTO: 80.6 FL — SIGNIFICANT CHANGE UP (ref 80–100)
MRSA PCR RESULT.: SIGNIFICANT CHANGE UP
NON HDL CHOLESTEROL: 90 MG/DL — SIGNIFICANT CHANGE UP
NRBC # BLD: 0 /100 WBCS — SIGNIFICANT CHANGE UP (ref 0–0)
PLATELET # BLD AUTO: 152 K/UL — SIGNIFICANT CHANGE UP (ref 150–400)
POTASSIUM SERPL-MCNC: 3.8 MMOL/L — SIGNIFICANT CHANGE UP (ref 3.5–5.3)
POTASSIUM SERPL-SCNC: 3.8 MMOL/L — SIGNIFICANT CHANGE UP (ref 3.5–5.3)
PROT SERPL-MCNC: 7.2 G/DL — SIGNIFICANT CHANGE UP (ref 6–8.3)
RBC # BLD: 4.44 M/UL — SIGNIFICANT CHANGE UP (ref 4.2–5.8)
RBC # FLD: 13.6 % — SIGNIFICANT CHANGE UP (ref 10.3–14.5)
S AUREUS DNA NOSE QL NAA+PROBE: SIGNIFICANT CHANGE UP
SODIUM SERPL-SCNC: 142 MMOL/L — SIGNIFICANT CHANGE UP (ref 135–145)
TRIGL SERPL-MCNC: 115 MG/DL — SIGNIFICANT CHANGE UP
WBC # BLD: 5.44 K/UL — SIGNIFICANT CHANGE UP (ref 3.8–10.5)
WBC # FLD AUTO: 5.44 K/UL — SIGNIFICANT CHANGE UP (ref 3.8–10.5)

## 2022-07-21 PROCEDURE — 99239 HOSP IP/OBS DSCHRG MGMT >30: CPT

## 2022-07-21 PROCEDURE — 99233 SBSQ HOSP IP/OBS HIGH 50: CPT

## 2022-07-21 RX ORDER — ATORVASTATIN CALCIUM 80 MG/1
1 TABLET, FILM COATED ORAL
Qty: 0 | Refills: 0 | DISCHARGE

## 2022-07-21 RX ORDER — LOSARTAN POTASSIUM 100 MG/1
1 TABLET, FILM COATED ORAL
Qty: 0 | Refills: 0 | DISCHARGE

## 2022-07-21 RX ORDER — PANTOPRAZOLE SODIUM 20 MG/1
1 TABLET, DELAYED RELEASE ORAL
Qty: 0 | Refills: 0 | DISCHARGE

## 2022-07-21 RX ORDER — ASPIRIN/CALCIUM CARB/MAGNESIUM 324 MG
1 TABLET ORAL
Qty: 0 | Refills: 0 | DISCHARGE
Start: 2022-07-21

## 2022-07-21 RX ORDER — CHLORHEXIDINE GLUCONATE 213 G/1000ML
1 SOLUTION TOPICAL
Refills: 0 | Status: DISCONTINUED | OUTPATIENT
Start: 2022-07-21 | End: 2022-07-21

## 2022-07-21 RX ORDER — CLOPIDOGREL BISULFATE 75 MG/1
1 TABLET, FILM COATED ORAL
Qty: 0 | Refills: 0 | DISCHARGE

## 2022-07-21 RX ORDER — METOPROLOL TARTRATE 50 MG
1 TABLET ORAL
Qty: 0 | Refills: 0 | DISCHARGE
Start: 2022-07-21

## 2022-07-21 RX ORDER — ATORVASTATIN CALCIUM 80 MG/1
1 TABLET, FILM COATED ORAL
Qty: 0 | Refills: 0 | DISCHARGE
Start: 2022-07-21

## 2022-07-21 RX ORDER — ATORVASTATIN CALCIUM 80 MG/1
1 TABLET, FILM COATED ORAL
Qty: 30 | Refills: 0
Start: 2022-07-21 | End: 2022-08-19

## 2022-07-21 RX ORDER — ASPIRIN/CALCIUM CARB/MAGNESIUM 324 MG
1 TABLET ORAL
Qty: 0 | Refills: 0 | DISCHARGE

## 2022-07-21 RX ORDER — PANTOPRAZOLE SODIUM 20 MG/1
1 TABLET, DELAYED RELEASE ORAL
Qty: 14 | Refills: 0
Start: 2022-07-21 | End: 2022-08-03

## 2022-07-21 RX ORDER — METOPROLOL TARTRATE 50 MG
1 TABLET ORAL
Qty: 30 | Refills: 0
Start: 2022-07-21 | End: 2022-08-19

## 2022-07-21 RX ORDER — METOPROLOL TARTRATE 50 MG
1 TABLET ORAL
Qty: 0 | Refills: 0 | DISCHARGE

## 2022-07-21 RX ORDER — FUROSEMIDE 40 MG
1 TABLET ORAL
Qty: 0 | Refills: 0 | DISCHARGE

## 2022-07-21 RX ORDER — INSULIN LISPRO 100/ML
0 VIAL (ML) SUBCUTANEOUS
Qty: 0 | Refills: 0 | DISCHARGE

## 2022-07-21 RX ADMIN — PANTOPRAZOLE SODIUM 40 MILLIGRAM(S): 20 TABLET, DELAYED RELEASE ORAL at 05:25

## 2022-07-21 RX ADMIN — Medication 1 DROP(S): at 05:26

## 2022-07-21 RX ADMIN — Medication 25 MILLIGRAM(S): at 05:26

## 2022-07-21 RX ADMIN — Medication 81 MILLIGRAM(S): at 11:37

## 2022-07-21 RX ADMIN — HEPARIN SODIUM 5000 UNIT(S): 5000 INJECTION INTRAVENOUS; SUBCUTANEOUS at 05:26

## 2022-07-21 NOTE — DISCHARGE NOTE PROVIDER - NSDCCPCAREPLAN_GEN_ALL_CORE_FT
PRINCIPAL DISCHARGE DIAGNOSIS  Diagnosis: Hypertrophic cardiomyopathy  Assessment and Plan of Treatment: Make appointment(s) to follow up with a cardiologist   you will need further outpatient testing including Cardiac MRI and Gentic Testing      SECONDARY DISCHARGE DIAGNOSES  Diagnosis: 2019 novel coronavirus disease (COVID-19)  Assessment and Plan of Treatment: You have tested POSITIVE for the novel coronavirus (COVID-19) Please follow up with your primary care physician within 2-3 weeks of your discharge from the hospital. Please take all medications as prescribed. If you experience any worsening or recurrence of your symptoms, particularly worsening or high fever, shortness of breathe, extreme fatigue, or bloody cough please call [phone] immediately or report to the nearest Emergency Department.

## 2022-07-21 NOTE — DISCHARGE NOTE PROVIDER - NSDCFUADDINST_GEN_ALL_CORE_FT
Follow up with Dr. Gallagher in HCM clinic outpatient 2-3 weeks from discharge  Make appointment(s) to follow up with your Healthcare providers (or you can follow up in the Clinics at [facility]) - bring all discharge paperwork including discharge medication list to follow up appointment

## 2022-07-21 NOTE — DISCHARGE NOTE NURSING/CASE MANAGEMENT/SOCIAL WORK - PATIENT PORTAL LINK FT
You can access the FollowMyHealth Patient Portal offered by [facility] by registering at the following website: [email or web address]. By joining [facility]’s FollowMyHealth portal, you will also be able to view your health information using other applications (apps) compatible with our system.

## 2022-07-21 NOTE — PROGRESS NOTE ADULT - ASSESSMENT
This is a 66yo Male with PMHx of HTN, HLD, T2DM, CAD, presented to [facility] on 7/12 complaining of exertional chest pain for one year. TTE there with possible HCM, unable to see. Transferred for ischemic workup, incidentally found to be Covid positive, Cr 1.4-1.5. hsTrop negative. EKG sinus with TWIs I, aVL, V2-V6.    1. Chest Pain  2. Apical Variant Hypertrophic Cardiomyopathy  - TTE here with apical variant hypertrophic cardiomyopathy which could explain his sxs. No hx of syncope. No family hx of cardiomyopathies.  - Aspirin 81mg, Atorvastatin 80mg qHS  - Metoprolol Succinate 25mg daily  - Outpatient Cardiac MRI, genetic testing   - Follow up with Dr. Gallagher in HCM clinic outpatient 2-3 weeks from discharge  - Discussed above with patient and daughter. Low suspicion at this time for ischemic heart disease and given elevated Cr, will hold off on angiogram or CT Coronaries (discussed also with Interventional Cardiology). If patient continues to have sxs despite beta blocker, could consider outpatient CT Coronaries once Cr is improved. Ruled out for MI with negative troponins. Patient and daughter agreeable to this plan.     2. Covid-19 positive  - Asymptomatic at this time       Benoit Ureña MD  Cardiology Fellow - PGY 5  Text or Call: [phone]  For all New Consults and Questions:  [email or web address]   Login: [email or web address] This is a 68yo Male with PMHx of HTN, HLD, T2DM, CAD, presented to [facility] on 7/12 complaining of exertional chest pain for one year. TTE there with possible HCM, unable to see. Transferred for ischemic workup, incidentally found to be Covid positive, Cr 1.4-1.5. hsTrop negative. EKG sinus with TWIs I, aVL, V2-V6, suggestive of apical variant HCM  TTE images reviewed, consistent with apical variant HCM.     1. Chest Pain  2. Apical Variant Hypertrophic Cardiomyopathy  - TTE here with apical variant hypertrophic cardiomyopathy which could explain his sxs. No hx of syncope. No family hx of cardiomyopathies.  - Aspirin 81mg, Atorvastatin 80mg qHS  - Metoprolol Succinate 25mg daily  - Outpatient Cardiac MRI, genetic testing , outpt genetic evaluation with Dr. Farmer  - Follow up with Dr. Gallagher in HCM clinic outpatient 2-3 weeks from discharge  - Discussed above with patient and daughter. Low suspicion at this time for ischemic heart disease and given elevated Cr, will hold off on angiogram or CT Coronaries (discussed also with Interventional Cardiology). If patient continues to have sxs despite beta blocker, could consider outpatient CT Coronaries once Cr is improved. Ruled out for MI with negative troponins. Patient and daughter agreeable to this plan.     2. Covid-19 positive  - Asymptomatic at this time       Benoit Ureña MD  Cardiology Fellow - PGY 5  Text or Call: [phone]  For all New Consults and Questions:  [email or web address]   Login: [email or web address]    Patient seen and examined with the fellow, the note above has been edited to reflect my independent history, physical exam, assessment and plan.      Jamari Farmer MD, PhD  Cardiology Attending  [facility]    For day time coverage Mon-Fri see Non-Service Consult Attending on [email or web address], password: [email or web address]; daytime weekends covered by general cardiology consult service attending.)

## 2022-07-21 NOTE — DISCHARGE NOTE PROVIDER - NSDCMRMEDTOKEN_GEN_ALL_CORE_FT
Aspirin Enteric Coated 81 mg oral delayed release tablet: 1 tab(s) orally once a day  hospital  insulin lispro 100 units/mL subcutaneous solution: sliding scale   hospital  Lasix 40 mg oral tablet: 1 tab(s) orally once a day  home  Lipitor 80 mg oral tablet: 1 tab(s) orally once a day  hospital  Liquifilm Tears preserved ophthalmic solution: 1 drop(s) to each affected eye 3 times a day  hospital  losartan 50 mg oral tablet: 1 tab(s) orally once a day  home  metFORMIN 500 mg oral tablet: 1 tab(s) orally 2 times a day  home  Metoprolol Succinate ER 25 mg oral tablet, extended release: 1 tab(s) orally once a day  hospital  Plavix 75 mg oral tablet: 1 tab(s) orally once a day  home  Protonix 40 mg oral delayed release tablet: 1 tab(s) orally once a day  hospital  Timolol Maleate, Ophthalmic 0.5% preservative-free ophthalmic solution: 1 drop(s) to each affected eye 2 times a day  hospital   aspirin 81 mg oral delayed release tablet: 1 tab(s) orally once a day  atorvastatin 80 mg oral tablet: 1 tab(s) orally once a day (at bedtime)  Liquifilm Tears preserved ophthalmic solution: 1 drop(s) to each affected eye 3 times a day  hospital  metFORMIN 500 mg oral tablet: 1 tab(s) orally 2 times a day  home  metoprolol succinate 25 mg oral tablet, extended release: 1 tab(s) orally once a day  Protonix 40 mg oral delayed release tablet: 1 tab(s) orally once a day  hospital  Timolol Maleate, Ophthalmic 0.5% preservative-free ophthalmic solution: 1 drop(s) to each affected eye 2 times a day  hospital

## 2022-07-21 NOTE — CHART NOTE - NSCHARTNOTEFT_GEN_A_CORE
Medicine Attending - Discharge Day Note:  ================================================    # Apical Variant Hypertrophic Cardiomyopathy    Likely explains chest pain with ambulation. Seen by cardiology - to have more outpatient testing.     Discharge time spent 35 minutes.     Afshin Veronica MD, ALEX  Available on Microsoft Teams Medicine Attending - Discharge Day Note:  ================================================    # Apical Variant Hypertrophic Cardiomyopathy  # obesity  # HTN  # HLD  # T2DM  # CAD    Likely explains chest pain with ambulation. Seen by cardiology - to have more outpatient testing.     Discharge time spent 35 minutes.     Afshin Veronica MD, ALEX  Available on Microsoft Teams

## 2022-07-21 NOTE — DISCHARGE NOTE PROVIDER - CARE PROVIDER_API CALL
Regulo Gallagher)  Cardiology  [address] [place] [address]  Phone: [phone]  Fax: [phone]  Follow Up Time:

## 2022-07-21 NOTE — DISCHARGE NOTE PROVIDER - NSDCFUSCHEDAPPT_GEN_ALL_CORE_FT
Regulo Gallagher [facility]  CARDIOLOGY [address]  Scheduled Appointment: 08/12/2022

## 2022-07-21 NOTE — DISCHARGE NOTE PROVIDER - NSFOLLOWUPCLINICS_GEN_ALL_ED_FT
[facility]  Cardiology  [address] [place] [address]  Phone: [phone]  Fax:     [facility]  Primary Care  [address] [place] [address]  Phone: [phone]  Fax:

## 2022-07-21 NOTE — DISCHARGE NOTE NURSING/CASE MANAGEMENT/SOCIAL WORK - NSDCPEFALRISK_GEN_ALL_CORE
For information on Fall & Injury Prevention, visit: [email or web address] OR  [email or web address] OR  https://www.cdc.gov/steadi/patient.html

## 2022-07-21 NOTE — DISCHARGE NOTE PROVIDER - HOSPITAL COURSE
This is a 66yo Male with PMHx of HTN, HLD, T2DM, CAD, presented to [facility] on 7/12/22 complaining of exertional chest pain for one year. TTE there with possible HCM, unable to see. Transferred for ischemic workup, incidentally found to be Covid positive, Cr 1.4-1.5. hsTrop negative. EKG sinus with TWIs I, aVL, V2-V6.  - Chest Pain - resolved  -  Apical Variant Hypertrophic Cardiomyopathy  	TTE here with apical variant hypertrophic cardiomyopathy which could explain his sxs. No hx of syncope. No family hx of cardiomyopathies.  	Aspirin 81mg, Atorvastatin 80mg qHS  	 Metoprolol Succinate 25mg daily  	 Outpatient Cardiac MRI, genetic testing   	Follow up with Dr. Gallagher in HCM clinic outpatient 2-3 weeks from discharge  	Discussed above with patient and daughter. Low suspicion at this time for ischemic heart disease and given elevated Cr, will hold off on angiogram or CT Coronaries (discussed also with Interventional Cardiology). If 			patient continues to have sxs despite beta blocker, could consider outpatient CT Coronaries once Cr is improved. Ruled out for MI with negative troponins. Patient and daughter agreeable to this plan.   -  Covid-19 positive -  Asymptomatic at this time This is a 66yo Male with PMHx of HTN, HLD, T2DM, CAD, presented to [facility] on 7/12/22 complaining of exertional chest pain for one year. TTE there with possible HCM, unable to see. Transferred for ischemic workup, incidentally found to be Covid positive, Cr 1.4-1.5. hsTrop negative. EKG sinus with TWIs I, aVL, V2-V6.  - Chest Pain - resolved  -  Apical Variant Hypertrophic Cardiomyopathy  	TTE here with apical variant hypertrophic cardiomyopathy which could explain his sxs. No hx of syncope. No family hx of cardiomyopathies.  	Aspirin 81mg, Atorvastatin 80mg qHS  	 Metoprolol Succinate 25mg daily  	 Outpatient Cardiac MRI, genetic testing   	Follow up with Dr. Gallagher in HCM clinic outpatient 2-3 weeks from discharge  	Discussed above with patient and daughter. Low suspicion at this time for ischemic heart disease and given elevated Cr, will hold off on angiogram or CT Coronaries (discussed also with Interventional Cardiology). If 			patient continues to have sxs despite beta blocker, could consider outpatient CT Coronaries once Cr is improved. Ruled out for MI with negative troponins. Patient and daughter agreeable to this plan.   -  Covid-19 positive -  Asymptomatic at this time     Discharge Diagnoses:  # Apical Variant Hypertrophic Cardiomyopathy - Likely explains chest pain with ambulation.  Seen by cardiology - to have more outpatient testing.   # obesity  # HTN  # HLD  # T2DM  # CAD

## 2022-07-25 PROBLEM — E78.5 HYPERLIPIDEMIA, UNSPECIFIED: Chronic | Status: ACTIVE | Noted: 2022-07-20

## 2022-07-25 PROBLEM — I10 ESSENTIAL (PRIMARY) HYPERTENSION: Chronic | Status: ACTIVE | Noted: 2022-07-20

## 2022-07-25 PROBLEM — E11.9 TYPE 2 DIABETES MELLITUS WITHOUT COMPLICATIONS: Chronic | Status: ACTIVE | Noted: 2022-07-20

## 2022-07-25 PROBLEM — Z00.00 ENCOUNTER FOR PREVENTIVE HEALTH EXAMINATION: Status: ACTIVE | Noted: 2022-07-25

## 2022-08-09 DIAGNOSIS — I42.2 OTHER HYPERTROPHIC CARDIOMYOPATHY: ICD-10-CM

## 2022-08-09 DIAGNOSIS — I10 ESSENTIAL (PRIMARY) HYPERTENSION: ICD-10-CM

## 2022-08-09 DIAGNOSIS — I25.10 ATHEROSCLEROTIC HEART DISEASE OF NATIVE CORONARY ARTERY W/OUT ANGINA PECTORIS: ICD-10-CM

## 2022-08-09 DIAGNOSIS — E11.9 TYPE 2 DIABETES MELLITUS W/OUT COMPLICATIONS: ICD-10-CM

## 2022-08-09 DIAGNOSIS — E78.5 HYPERLIPIDEMIA, UNSPECIFIED: ICD-10-CM

## 2022-08-09 DIAGNOSIS — U07.1 COVID-19: ICD-10-CM

## 2022-08-09 RX ORDER — ASPIRIN ENTERIC COATED TABLETS 81 MG 81 MG/1
81 TABLET, DELAYED RELEASE ORAL
Qty: 30 | Refills: 11 | Status: ACTIVE | COMMUNITY

## 2022-08-09 RX ORDER — METOPROLOL SUCCINATE 25 MG/1
25 TABLET, EXTENDED RELEASE ORAL
Qty: 30 | Refills: 4 | Status: ACTIVE | COMMUNITY

## 2022-08-11 PROCEDURE — 93306 TTE W/DOPPLER COMPLETE: CPT

## 2022-08-11 PROCEDURE — 82803 BLOOD GASES ANY COMBINATION: CPT

## 2022-08-11 PROCEDURE — 71045 X-RAY EXAM CHEST 1 VIEW: CPT

## 2022-08-11 PROCEDURE — 87641 MR-STAPH DNA AMP PROBE: CPT

## 2022-08-11 PROCEDURE — 82947 ASSAY GLUCOSE BLOOD QUANT: CPT

## 2022-08-11 PROCEDURE — 85027 COMPLETE CBC AUTOMATED: CPT

## 2022-08-11 PROCEDURE — 85379 FIBRIN DEGRADATION QUANT: CPT

## 2022-08-11 PROCEDURE — 84100 ASSAY OF PHOSPHORUS: CPT

## 2022-08-11 PROCEDURE — 82728 ASSAY OF FERRITIN: CPT

## 2022-08-11 PROCEDURE — 87635 SARS-COV-2 COVID-19 AMP PRB: CPT

## 2022-08-11 PROCEDURE — 86803 HEPATITIS C AB TEST: CPT

## 2022-08-11 PROCEDURE — U0003: CPT

## 2022-08-11 PROCEDURE — 83605 ASSAY OF LACTIC ACID: CPT

## 2022-08-11 PROCEDURE — 93005 ELECTROCARDIOGRAM TRACING: CPT

## 2022-08-11 PROCEDURE — 36415 COLL VENOUS BLD VENIPUNCTURE: CPT

## 2022-08-11 PROCEDURE — 80053 COMPREHEN METABOLIC PANEL: CPT

## 2022-08-11 PROCEDURE — 84484 ASSAY OF TROPONIN QUANT: CPT

## 2022-08-11 PROCEDURE — 82330 ASSAY OF CALCIUM: CPT

## 2022-08-11 PROCEDURE — 83735 ASSAY OF MAGNESIUM: CPT

## 2022-08-11 PROCEDURE — 82435 ASSAY OF BLOOD CHLORIDE: CPT

## 2022-08-11 PROCEDURE — 83036 HEMOGLOBIN GLYCOSYLATED A1C: CPT

## 2022-08-11 PROCEDURE — 85014 HEMATOCRIT: CPT

## 2022-08-11 PROCEDURE — 86140 C-REACTIVE PROTEIN: CPT

## 2022-08-11 PROCEDURE — 82962 GLUCOSE BLOOD TEST: CPT

## 2022-08-11 PROCEDURE — 85018 HEMOGLOBIN: CPT

## 2022-08-11 PROCEDURE — 80048 BASIC METABOLIC PNL TOTAL CA: CPT

## 2022-08-11 PROCEDURE — 80061 LIPID PANEL: CPT

## 2022-08-11 PROCEDURE — 84295 ASSAY OF SERUM SODIUM: CPT

## 2022-08-11 PROCEDURE — 84132 ASSAY OF SERUM POTASSIUM: CPT

## 2022-08-11 PROCEDURE — 87640 STAPH A DNA AMP PROBE: CPT

## 2022-08-12 ENCOUNTER — NON-APPOINTMENT (OUTPATIENT)
Age: 67
End: 2022-08-12

## 2022-08-12 ENCOUNTER — APPOINTMENT (OUTPATIENT)
Dept: CARDIOLOGY | Facility: CLINIC | Age: 67
End: 2022-08-12

## 2022-08-12 VITALS
HEIGHT: 64 IN | DIASTOLIC BLOOD PRESSURE: 78 MMHG | SYSTOLIC BLOOD PRESSURE: 137 MMHG | BODY MASS INDEX: 32.74 KG/M2 | WEIGHT: 191.8 LBS

## 2022-08-12 VITALS — OXYGEN SATURATION: 98 % | HEART RATE: 65 BPM

## 2022-08-12 DIAGNOSIS — R07.9 CHEST PAIN, UNSPECIFIED: ICD-10-CM

## 2022-08-12 PROCEDURE — 99214 OFFICE O/P EST MOD 30 MIN: CPT

## 2022-08-12 PROCEDURE — 93000 ELECTROCARDIOGRAM COMPLETE: CPT

## 2022-08-12 RX ORDER — PANTOPRAZOLE 40 MG/1
40 TABLET, DELAYED RELEASE ORAL
Refills: 0 | Status: ACTIVE | COMMUNITY
Start: 2022-08-12

## 2022-08-12 RX ORDER — LOSARTAN POTASSIUM 50 MG/1
50 TABLET, FILM COATED ORAL DAILY
Qty: 90 | Refills: 2 | Status: ACTIVE | COMMUNITY
Start: 2022-08-12 | End: 1900-01-01

## 2022-08-12 RX ORDER — METFORMIN HYDROCHLORIDE 500 MG/1
500 TABLET, COATED ORAL TWICE DAILY
Qty: 180 | Refills: 1 | Status: ACTIVE | COMMUNITY
Start: 2022-08-12

## 2022-08-12 RX ORDER — ATORVASTATIN CALCIUM 80 MG/1
80 TABLET, FILM COATED ORAL
Qty: 90 | Refills: 3 | Status: ACTIVE | COMMUNITY
Start: 2022-08-12

## 2022-08-13 NOTE — PHYSICAL EXAM
[Normal Conjunctiva] : normal conjunctiva [Clear Lung Fields] : clear lung fields [Soft] : abdomen soft [Normal] : alert and oriented, normal memory [de-identified] : Obese

## 2022-08-13 NOTE — HISTORY OF PRESENT ILLNESS
[FreeTextEntry1] : Hawa Dalton is a 67 year old man who recently immigrated from [facility]. He lives with a daughter who accompanies him here today. \par \par Mr. Shaikh has been experiencing chest pain with exertion for two years' duration. Specifically, he states that this syndrome is elicited after walking only one block on level ground. He came to the emergency department for evaluation of this in June 2022, not because of a change in symptoms, but because he was no longer in [facility]. While admitted, he was discovered via echocardiography to have apical variant hypertrophic cardiomyopathy. \par \par There is no history of palpitations, lightheadedness, or syncope.\par \par There is a history of type-2 diabetes, which was discovered in 2016 (six years ago). It does not appear that an ischemic evaluation was done while an inpatient. Currently being treated with aspirin, metoprolol, and atorvastatin.\par \par DYAN screening:\par Snores; daytime somnolence (frequently takes short naps every day).\par

## 2022-08-13 NOTE — REVIEW OF SYSTEMS
[Chest Discomfort] : chest discomfort [Negative] : Psychiatric [SOB] : no shortness of breath [Dyspnea on exertion] : not dyspnea during exertion [Lower Ext Edema] : no extremity edema [Leg Claudication] : no intermittent leg claudication [Palpitations] : no palpitations [Orthopnea] : no orthopnea [Syncope] : no syncope [FreeTextEntry5] : See HPI

## 2022-08-13 NOTE — CARDIOLOGY SUMMARY
[de-identified] : Sinus rhythm with first degree A-V block \par Anterolateral T-wave inversions consistent with apical variant hypertrophic cardiomyopathy.

## 2022-08-13 NOTE — ASSESSMENT
[FreeTextEntry1] : Impressions:\par Apical variant HCM\par Chest pain syndrome which sounds anginal, but is chronic and does not appear to have progressed.\par Obesity\par At risk for obstructive sleep apnea. \par \par Recommendations:\par Cardiac MRI to check for LGE\par Exercise echo stress test (the apical HCM is mild and should not interfere with the interpretation). \par Dietary prudence to afford a sustained, meaningful weight loss\par Evaluation for obstructive sleep apnea.

## 2022-09-01 ENCOUNTER — OUTPATIENT (OUTPATIENT)
Dept: OUTPATIENT SERVICES | Facility: HOSPITAL | Age: 67
LOS: 1 days | End: 2022-09-01
Payer: MEDICAID

## 2022-09-01 ENCOUNTER — APPOINTMENT (OUTPATIENT)
Dept: CV DIAGNOSITCS | Facility: HOSPITAL | Age: 67
End: 2022-09-01

## 2022-09-01 DIAGNOSIS — R07.9 CHEST PAIN, UNSPECIFIED: ICD-10-CM

## 2022-09-01 PROCEDURE — 93325 DOPPLER ECHO COLOR FLOW MAPG: CPT | Mod: 26

## 2022-09-01 PROCEDURE — 93351 STRESS TTE COMPLETE: CPT | Mod: 26

## 2022-09-01 PROCEDURE — 93351 STRESS TTE COMPLETE: CPT

## 2022-09-01 PROCEDURE — 93325 DOPPLER ECHO COLOR FLOW MAPG: CPT

## 2022-09-01 PROCEDURE — 93320 DOPPLER ECHO COMPLETE: CPT

## 2022-09-01 PROCEDURE — 93320 DOPPLER ECHO COMPLETE: CPT | Mod: 26

## 2022-09-07 ENCOUNTER — TRANSCRIPTION ENCOUNTER (OUTPATIENT)
Age: 67
End: 2022-09-07

## 2022-09-08 ENCOUNTER — APPOINTMENT (OUTPATIENT)
Dept: ELECTROPHYSIOLOGY | Facility: CLINIC | Age: 67
End: 2022-09-08

## 2022-09-08 ENCOUNTER — NON-APPOINTMENT (OUTPATIENT)
Age: 67
End: 2022-09-08

## 2022-09-08 PROCEDURE — 95800 SLP STDY UNATTENDED: CPT

## 2022-09-09 ENCOUNTER — OUTPATIENT (OUTPATIENT)
Dept: OUTPATIENT SERVICES | Facility: HOSPITAL | Age: 67
LOS: 1 days | End: 2022-09-09
Payer: MEDICAID

## 2022-09-09 ENCOUNTER — TRANSCRIPTION ENCOUNTER (OUTPATIENT)
Age: 67
End: 2022-09-09

## 2022-09-09 VITALS
SYSTOLIC BLOOD PRESSURE: 192 MMHG | DIASTOLIC BLOOD PRESSURE: 86 MMHG | HEIGHT: 64 IN | RESPIRATION RATE: 16 BRPM | OXYGEN SATURATION: 97 % | TEMPERATURE: 98 F | HEART RATE: 48 BPM | WEIGHT: 194.01 LBS

## 2022-09-09 VITALS
RESPIRATION RATE: 16 BRPM | OXYGEN SATURATION: 95 % | HEART RATE: 50 BPM | SYSTOLIC BLOOD PRESSURE: 170 MMHG | DIASTOLIC BLOOD PRESSURE: 87 MMHG

## 2022-09-09 DIAGNOSIS — R94.39 ABNORMAL RESULT OF OTHER CARDIOVASCULAR FUNCTION STUDY: ICD-10-CM

## 2022-09-09 LAB
ANION GAP SERPL CALC-SCNC: 10 MMOL/L — SIGNIFICANT CHANGE UP (ref 5–17)
BUN SERPL-MCNC: 18 MG/DL — SIGNIFICANT CHANGE UP (ref 7–23)
CALCIUM SERPL-MCNC: 10 MG/DL — SIGNIFICANT CHANGE UP (ref 8.4–10.5)
CHLORIDE SERPL-SCNC: 104 MMOL/L — SIGNIFICANT CHANGE UP (ref 96–108)
CO2 SERPL-SCNC: 27 MMOL/L — SIGNIFICANT CHANGE UP (ref 22–31)
CREAT SERPL-MCNC: 1.5 MG/DL — HIGH (ref 0.5–1.3)
EGFR: 50 ML/MIN/1.73M2 — LOW
GLUCOSE BLDC GLUCOMTR-MCNC: 112 MG/DL — HIGH (ref 70–99)
GLUCOSE BLDC GLUCOMTR-MCNC: 84 MG/DL — SIGNIFICANT CHANGE UP (ref 70–99)
GLUCOSE SERPL-MCNC: 111 MG/DL — HIGH (ref 70–99)
HCT VFR BLD CALC: 37.5 % — LOW (ref 39–50)
HGB BLD-MCNC: 12.1 G/DL — LOW (ref 13–17)
MCHC RBC-ENTMCNC: 25.2 PG — LOW (ref 27–34)
MCHC RBC-ENTMCNC: 32.3 GM/DL — SIGNIFICANT CHANGE UP (ref 32–36)
MCV RBC AUTO: 78.1 FL — LOW (ref 80–100)
NRBC # BLD: 0 /100 WBCS — SIGNIFICANT CHANGE UP (ref 0–0)
PLATELET # BLD AUTO: 176 K/UL — SIGNIFICANT CHANGE UP (ref 150–400)
POTASSIUM SERPL-MCNC: 4.7 MMOL/L — SIGNIFICANT CHANGE UP (ref 3.5–5.3)
POTASSIUM SERPL-SCNC: 4.7 MMOL/L — SIGNIFICANT CHANGE UP (ref 3.5–5.3)
RBC # BLD: 4.8 M/UL — SIGNIFICANT CHANGE UP (ref 4.2–5.8)
RBC # FLD: 14.1 % — SIGNIFICANT CHANGE UP (ref 10.3–14.5)
SODIUM SERPL-SCNC: 141 MMOL/L — SIGNIFICANT CHANGE UP (ref 135–145)
WBC # BLD: 7.18 K/UL — SIGNIFICANT CHANGE UP (ref 3.8–10.5)
WBC # FLD AUTO: 7.18 K/UL — SIGNIFICANT CHANGE UP (ref 3.8–10.5)

## 2022-09-09 PROCEDURE — C9600: CPT | Mod: RC

## 2022-09-09 PROCEDURE — 99152 MOD SED SAME PHYS/QHP 5/>YRS: CPT

## 2022-09-09 PROCEDURE — 85027 COMPLETE CBC AUTOMATED: CPT

## 2022-09-09 PROCEDURE — 93005 ELECTROCARDIOGRAM TRACING: CPT

## 2022-09-09 PROCEDURE — 92928 PRQ TCAT PLMT NTRAC ST 1 LES: CPT | Mod: RC

## 2022-09-09 PROCEDURE — 80048 BASIC METABOLIC PNL TOTAL CA: CPT

## 2022-09-09 PROCEDURE — 82962 GLUCOSE BLOOD TEST: CPT

## 2022-09-09 PROCEDURE — 93010 ELECTROCARDIOGRAM REPORT: CPT

## 2022-09-09 PROCEDURE — C1887: CPT

## 2022-09-09 PROCEDURE — 93454 CORONARY ARTERY ANGIO S&I: CPT | Mod: 59

## 2022-09-09 PROCEDURE — C1894: CPT

## 2022-09-09 PROCEDURE — 93454 CORONARY ARTERY ANGIO S&I: CPT | Mod: 26,59

## 2022-09-09 PROCEDURE — C1725: CPT

## 2022-09-09 PROCEDURE — 93010 ELECTROCARDIOGRAM REPORT: CPT | Mod: 77

## 2022-09-09 PROCEDURE — C1769: CPT

## 2022-09-09 PROCEDURE — C1874: CPT

## 2022-09-09 RX ORDER — SODIUM CHLORIDE 9 MG/ML
250 INJECTION INTRAMUSCULAR; INTRAVENOUS; SUBCUTANEOUS ONCE
Refills: 0 | Status: COMPLETED | OUTPATIENT
Start: 2022-09-09 | End: 2022-09-09

## 2022-09-09 RX ORDER — METFORMIN HYDROCHLORIDE 850 MG/1
1 TABLET ORAL
Qty: 0 | Refills: 0 | DISCHARGE

## 2022-09-09 RX ORDER — TIMOLOL 0.5 %
1 DROPS OPHTHALMIC (EYE)
Qty: 0 | Refills: 0 | DISCHARGE

## 2022-09-09 RX ORDER — SODIUM CHLORIDE 9 MG/ML
1000 INJECTION INTRAMUSCULAR; INTRAVENOUS; SUBCUTANEOUS
Refills: 0 | Status: DISCONTINUED | OUTPATIENT
Start: 2022-09-09 | End: 2022-09-23

## 2022-09-09 RX ADMIN — SODIUM CHLORIDE 750 MILLILITER(S): 9 INJECTION INTRAMUSCULAR; INTRAVENOUS; SUBCUTANEOUS at 09:45

## 2022-09-09 RX ADMIN — SODIUM CHLORIDE 100 MILLILITER(S): 9 INJECTION INTRAMUSCULAR; INTRAVENOUS; SUBCUTANEOUS at 14:10

## 2022-09-09 RX ADMIN — SODIUM CHLORIDE 75 MILLILITER(S): 9 INJECTION INTRAMUSCULAR; INTRAVENOUS; SUBCUTANEOUS at 10:45

## 2022-09-09 NOTE — ASU PATIENT PROFILE, ADULT - FALL HARM RISK - FALL HARM RISK
Last visit: 10-2-19  Last refill: 7-18-19  Last labs: 7-12-19  Refilled per standing orders    
She has cpe scheduled for 6/3/20  
No indicators present

## 2022-09-09 NOTE — H&P CARDIOLOGY - HISTORY OF PRESENT ILLNESS
66 y/o male w/ PMHx HTN, HLD, T2DM, glaucoma, ? CAD (patient reports that in 2021 while in his [place] [facility], he told he had a minor heart attack during his annual physical, no intervention was done and patient was told to take an aspirin) who was initially admitted to [facility] on 7/12/22 c/o chest pain. Per patient, he had been experiencing chest pain/pressure for the past year with exertion that would resolve with rest. Echo @ OSH was consistent with hypertrophic cardiomyopathy (no official report in transfer paperwork) and patient was transferred to [facility] for catheterization. While at [facility], patient was found to be COVID (+) and cath was deferred, ischemic w/u was not done prior to discharge.  Patient presented to Cardiologist - Dr. Gallagher for follow-up in 8/2022 and continues to endorse class III anginal equivalent exertional dyspnea.  He had stress-echo in 9/2022 which was found to be abnormal (report below).  Patient presents today for further ischemic w/u - [facility].     Echocardiographic Study:  (A) Baseline echocardiogram reveals:  Apical hypertrophy.  Normal left ventricular systolic function. No segmental  wall motion abnormalities.  (B) Stress echocardiogram reveals:  Inferior wall akinesis induced with exercise.  ------------------------------------------------------------------------  Conclusions:  1. Exercise capacity is 3 METS, which is poor for age and  gender.  2. Normal hemodynamic response.  3. Chest pain reproduced with minimal exertion.  4. Inferior wall akinesis induced with exercise.  5. Periods of second degree sinoatrial exit block type-2  prior to exercise. PVCs during exercise.

## 2022-09-09 NOTE — ASU DISCHARGE PLAN (ADULT/PEDIATRIC) - CARE PROVIDER_API CALL
Regulo Gallagher)  Cardiology  [address] [place] [address]  Phone: [phone]  Fax: [phone]  Established Patient  Follow Up Time: 2 weeks

## 2022-09-09 NOTE — ASU DISCHARGE PLAN (ADULT/PEDIATRIC) - ASU DC SPECIAL INSTRUCTIONSFT
Please see pre printed discharge instructions.    You will return in 2 weeks for another cardiac catheterization with stents.

## 2022-09-09 NOTE — ASU DISCHARGE PLAN (ADULT/PEDIATRIC) - BATHING
RESPIRATORY ASSESSMENT PROTOCOL                                                                                              Patient Name: Hedy Gregorio Room#: 2730/8095-21 : 1948     Admitting diagnosis: Primary osteoarthritis of right knee [M17.11]       Medical History:   Past Medical History:   Diagnosis Date    CAD (coronary artery disease)     Diabetes mellitus ([address]     GERD (gastroesophageal reflux disease)     Hyperlipidemia     Hypertension        PATIENT ASSESSMENT    LABORATORY DATA  Hematology:   Lab Results   Component Value Date    WBC 7.2 2019    RBC 4.89 2019    HGB 13.8 2019    HCT 44.8 2019     2019     Chemistry:  No results found for: PHART, ZFS6EFF, PO2ART, P5XWTULD, ISI9ZKE, PBEA    Blood Culture:   Sputum Culture:     VITALS  Pulse: 57   Resp: 16  BP: (!) 107/49  SpO2: 97 % O2 Device: None (Room air)  Temp: 97 °F (36.1 °C)  Comment:   SKIN COLOR  [x] Normal  [] Pale  [] Dusky  [] Cyanotic      RESPIRATORY PATTERN  [x] Normal  [] Dyspnea  [] Cheyne-Bernard  [] Kussmaul  [] Biots  AMBULATORY  [] Yes  [] No  [x] With Assistance    PEAK FLOW  Predicted:     Personal Best:        VITAL CAPACITY  Predicted value:  ml  Actual Value:  ml  30% of Predicted:  ml  Patient Acuity 0 1 2 3 4 Score   Level of Concious (LOC) [x]  Alert & Oriented or Pt normal LOC []  Confused;follows directions []  Confused & uncooper-ative []  Obtunded []  Comatose 0   Respiratory Rate  (RR) [x]  Reg. rate & pattern. 12 - 20 bpm  []  Increased RR. Greater than 20 bpm   []  SOB w/ exertion or RR greater than 24 bpm []  Access- ory muscle use at rest. Abn.  resp. []  SOB at rest.   0   Bilateral Breath Sounds (BBS) [x]  Clear []  Diminish-ed bases  []  Diminish-ed t/o, or rales   []  Sporadic, scattered wheezes or rhonchi []  Persistentwheezes and, or absent BBS 0   Cough [x]  Strong, effective, & non-prod. []  Effective & prod.  Less than 25 ml (2 TBSP) over past 24 Acuity = 4, and pt. can use MP. Notify physician if condition deteriorates. MDI THERAPY with  2 actuations of [physician-ordered bronchodilator(s)] via spacer TID Albuterol and PRNq4 hrs. If unable to utilize MDI: HHN [physician-ordered bronchodilator(s)] TID and Albuterol PRN q4 hrs. Notify physician if condition deteriorates. MDI THERAPY with  [physician-ordered bronchodilator(s)] via spacer TID PRN. If unable to utilize MDI: HHN [physician-ordered bronchodilator(s)] TID PRN. Notify physician if condition deteriorates. If Acuity Level is 2, 3, or 4 in any of the following:    [] COUGH     [] SURGICAL HISTORY (SURG HX)  [] CHEST XRAY (CXR)    Goal: Improvement in sputum mobilization in patients with ineffective airway clearance. Reverse atelectasis. [] Bronchopulmonary Hygiene Protocol    Total Acuity:   16-32  []  Secondary Assessment in 24 hrs Total Acuity:  9-15  []  Secondary Assessment in 24 hrs Total Acuity:  4-8  []  Secondary Assessment in 48 hrs Total Acuity:  0-3  []  Secondary Assessment in 72 hrs   METANEB QID with [physician-ordered bronchodilator(s)] if CXR Acuity = 4; otherwise:  PD&P, PEP, or Vest QID & PRN  NT Sxn PRN for ineffective cough  METANEB QID with [physician-ordered bronchodilator(s)] if CXR Acuity = 4; otherwise:  PD&P, PEP, or Vest TID & PRN  NT Sxn PRN for ineffective cough  Instruct patient to self-perform IS q1hr WA   Directed Cough self-performed q1hr WA     If Acuity Level is 2 or above in the following:    [] PULMONARY HISTORY (PULM HX)    Goal: Assist patient in quitting smoking to slow or stop the progression of lung disease.     [x] Smoking Cessation Protocol    SMOKING CESSATION EDUCATION provided according to policy US_648: (bao with an X)  ____Yes    ____ No     ___x_ NA    Smoking Cessation Booklet given:  ____Yes  ____No ____Patient Florinda Goodwin Shower only

## 2022-09-14 ENCOUNTER — FORM ENCOUNTER (OUTPATIENT)
Age: 67
End: 2022-09-14

## 2022-09-19 ENCOUNTER — NON-APPOINTMENT (OUTPATIENT)
Age: 67
End: 2022-09-19

## 2022-09-21 ENCOUNTER — OUTPATIENT (OUTPATIENT)
Dept: OUTPATIENT SERVICES | Facility: HOSPITAL | Age: 67
LOS: 1 days | End: 2022-09-21
Payer: MEDICAID

## 2022-09-21 ENCOUNTER — TRANSCRIPTION ENCOUNTER (OUTPATIENT)
Age: 67
End: 2022-09-21

## 2022-09-21 VITALS
HEART RATE: 56 BPM | SYSTOLIC BLOOD PRESSURE: 169 MMHG | RESPIRATION RATE: 18 BRPM | OXYGEN SATURATION: 99 % | DIASTOLIC BLOOD PRESSURE: 84 MMHG

## 2022-09-21 VITALS
SYSTOLIC BLOOD PRESSURE: 203 MMHG | HEART RATE: 56 BPM | RESPIRATION RATE: 18 BRPM | OXYGEN SATURATION: 98 % | TEMPERATURE: 98 F | DIASTOLIC BLOOD PRESSURE: 102 MMHG

## 2022-09-21 DIAGNOSIS — Z98.890 OTHER SPECIFIED POSTPROCEDURAL STATES: Chronic | ICD-10-CM

## 2022-09-21 DIAGNOSIS — I25.10 ATHEROSCLEROTIC HEART DISEASE OF NATIVE CORONARY ARTERY WITHOUT ANGINA PECTORIS: ICD-10-CM

## 2022-09-21 LAB
ALBUMIN SERPL ELPH-MCNC: 4.5 G/DL — SIGNIFICANT CHANGE UP (ref 3.3–5)
ALP SERPL-CCNC: 64 U/L — SIGNIFICANT CHANGE UP (ref 40–120)
ALT FLD-CCNC: 16 U/L — SIGNIFICANT CHANGE UP (ref 10–45)
ANION GAP SERPL CALC-SCNC: 8 MMOL/L — SIGNIFICANT CHANGE UP (ref 5–17)
AST SERPL-CCNC: 31 U/L — SIGNIFICANT CHANGE UP (ref 10–40)
BILIRUB SERPL-MCNC: 0.4 MG/DL — SIGNIFICANT CHANGE UP (ref 0.2–1.2)
BUN SERPL-MCNC: 22 MG/DL — SIGNIFICANT CHANGE UP (ref 7–23)
CALCIUM SERPL-MCNC: 9.7 MG/DL — SIGNIFICANT CHANGE UP (ref 8.4–10.5)
CHLORIDE SERPL-SCNC: 103 MMOL/L — SIGNIFICANT CHANGE UP (ref 96–108)
CO2 SERPL-SCNC: 28 MMOL/L — SIGNIFICANT CHANGE UP (ref 22–31)
CREAT SERPL-MCNC: 1.55 MG/DL — HIGH (ref 0.5–1.3)
EGFR: 48 ML/MIN/1.73M2 — LOW
GLUCOSE BLDC GLUCOMTR-MCNC: 100 MG/DL — HIGH (ref 70–99)
GLUCOSE SERPL-MCNC: 93 MG/DL — SIGNIFICANT CHANGE UP (ref 70–99)
HCT VFR BLD CALC: 39.9 % — SIGNIFICANT CHANGE UP (ref 39–50)
HGB BLD-MCNC: 12.7 G/DL — LOW (ref 13–17)
MCHC RBC-ENTMCNC: 24.8 PG — LOW (ref 27–34)
MCHC RBC-ENTMCNC: 31.8 GM/DL — LOW (ref 32–36)
MCV RBC AUTO: 77.9 FL — LOW (ref 80–100)
NRBC # BLD: 0 /100 WBCS — SIGNIFICANT CHANGE UP (ref 0–0)
PLATELET # BLD AUTO: 212 K/UL — SIGNIFICANT CHANGE UP (ref 150–400)
POTASSIUM SERPL-MCNC: 5.4 MMOL/L — HIGH (ref 3.5–5.3)
POTASSIUM SERPL-SCNC: 5.4 MMOL/L — HIGH (ref 3.5–5.3)
PROT SERPL-MCNC: 8.5 G/DL — HIGH (ref 6–8.3)
RBC # BLD: 5.12 M/UL — SIGNIFICANT CHANGE UP (ref 4.2–5.8)
RBC # FLD: 14.2 % — SIGNIFICANT CHANGE UP (ref 10.3–14.5)
SODIUM SERPL-SCNC: 139 MMOL/L — SIGNIFICANT CHANGE UP (ref 135–145)
WBC # BLD: 7.82 K/UL — SIGNIFICANT CHANGE UP (ref 3.8–10.5)
WBC # FLD AUTO: 7.82 K/UL — SIGNIFICANT CHANGE UP (ref 3.8–10.5)

## 2022-09-21 PROCEDURE — 36415 COLL VENOUS BLD VENIPUNCTURE: CPT

## 2022-09-21 PROCEDURE — C9600: CPT | Mod: RI

## 2022-09-21 PROCEDURE — 80053 COMPREHEN METABOLIC PANEL: CPT

## 2022-09-21 PROCEDURE — 99152 MOD SED SAME PHYS/QHP 5/>YRS: CPT

## 2022-09-21 PROCEDURE — C1769: CPT

## 2022-09-21 PROCEDURE — 0715T: CPT

## 2022-09-21 PROCEDURE — C1874: CPT

## 2022-09-21 PROCEDURE — C1725: CPT

## 2022-09-21 PROCEDURE — 92972 PERQ TRLUML CORONRY LITHOTRP: CPT

## 2022-09-21 PROCEDURE — C1761: CPT

## 2022-09-21 PROCEDURE — C1887: CPT

## 2022-09-21 PROCEDURE — 93005 ELECTROCARDIOGRAM TRACING: CPT

## 2022-09-21 PROCEDURE — 93010 ELECTROCARDIOGRAM REPORT: CPT

## 2022-09-21 PROCEDURE — 82962 GLUCOSE BLOOD TEST: CPT

## 2022-09-21 PROCEDURE — 92928 PRQ TCAT PLMT NTRAC ST 1 LES: CPT | Mod: RI

## 2022-09-21 PROCEDURE — 85027 COMPLETE CBC AUTOMATED: CPT

## 2022-09-21 PROCEDURE — C1894: CPT

## 2022-09-21 RX ORDER — SODIUM CHLORIDE 9 MG/ML
1000 INJECTION INTRAMUSCULAR; INTRAVENOUS; SUBCUTANEOUS
Refills: 0 | Status: DISCONTINUED | OUTPATIENT
Start: 2022-09-21 | End: 2022-10-05

## 2022-09-21 RX ORDER — SODIUM CHLORIDE 9 MG/ML
100 INJECTION INTRAMUSCULAR; INTRAVENOUS; SUBCUTANEOUS
Refills: 0 | Status: DISCONTINUED | OUTPATIENT
Start: 2022-09-21 | End: 2022-10-05

## 2022-09-21 RX ORDER — MULTIVIT-MIN/FERROUS GLUCONATE 9 MG/15 ML
1 LIQUID (ML) ORAL
Qty: 0 | Refills: 0 | DISCHARGE

## 2022-09-21 RX ORDER — METFORMIN HYDROCHLORIDE 850 MG/1
1 TABLET ORAL
Qty: 0 | Refills: 0 | DISCHARGE

## 2022-09-21 RX ORDER — SODIUM CHLORIDE 9 MG/ML
250 INJECTION INTRAMUSCULAR; INTRAVENOUS; SUBCUTANEOUS ONCE
Refills: 0 | Status: COMPLETED | OUTPATIENT
Start: 2022-09-21 | End: 2022-09-21

## 2022-09-21 RX ADMIN — SODIUM CHLORIDE 150 MILLILITER(S): 9 INJECTION INTRAMUSCULAR; INTRAVENOUS; SUBCUTANEOUS at 14:18

## 2022-09-21 RX ADMIN — SODIUM CHLORIDE 500 MILLILITER(S): 9 INJECTION INTRAMUSCULAR; INTRAVENOUS; SUBCUTANEOUS at 09:21

## 2022-09-21 RX ADMIN — SODIUM CHLORIDE 50 MILLILITER(S): 9 INJECTION INTRAMUSCULAR; INTRAVENOUS; SUBCUTANEOUS at 09:21

## 2022-09-21 NOTE — ASU DISCHARGE PLAN (ADULT/PEDIATRIC) - NS MD DC FALL RISK RISK
For information on Fall & Injury Prevention, visit: [email or web address] OR  [email or web address] OR  https://www.cdc.gov/steadi/patient.html

## 2022-09-21 NOTE — ASU PATIENT PROFILE, ADULT - FALL HARM RISK - UNIVERSAL INTERVENTIONS
Bed in lowest position, wheels locked, appropriate side rails in place/Call bell, personal items and telephone in reach/Instruct patient to call for assistance before getting out of bed or chair/Non-slip footwear when patient is out of bed/[place] to call system/Physically safe environment - no spills, clutter or unnecessary equipment/Purposeful Proactive Rounding/Room/bathroom lighting operational, light cord in reach

## 2022-09-21 NOTE — ASU DISCHARGE PLAN (ADULT/PEDIATRIC) - ASU DC SPECIAL INSTRUCTIONSFT
Wound Care:   the day AFTER your procedure remove bandage GENTLY, and clean using  mild soap and gentle warm, water stream, pat dry. leave OPEN to air. YOU MAY SHOWER   DO NOT apply lotions, creams, ointments, powder, perfumes to your incision site  DO NOT SOAK your site for 1 week ( no baths, no pools, no tubs, etc...)  Check  your groin and /or wrist daily. A small amount of bruising, and soreness are normal    ACTIVITY: for 24 hours   - DO NOT DRIVE  - DO NOT make any important decisions or sign legal documents   - DO NOT operate heavy machineries   - you may resume sexual activity in 48 hours, unless otherwise instructed by your cardiologist     If your procedure was done through the WRIST: for the NEXT 3DAYS:  - avoid pushing, pulling, with that affected wrist   - avoid repeated movement of that hand and wrist ( e.g: typing, hammering)  - DO NOT LIFT anything more than 5 lbs     If your procedure was done through the GROIN: for the NEXT 5 DAYS  - Limit climbing stairs, DO NOT soak in bathtub or pool  - no strenuous activities, pushing, pulling, straining  - Do not lift anything 10lbs or heavier     MEDICATION:   take your medications as explained ( see discharge paperwork)   If you received a STENT, you will be taking antiplatelet medications to KEEP YOUR STENT OPEN ( e.g: Aspirin, Plavix, Brilinta, Effient, etc).  Take as prescribed DO NOT STOP taking them without consulting with your cardiologist first.     Follow heart healthy diet recommended by your doctor, , if you smoke STOP SMOKING ( may call [phone] for center of tobacco control if you need assistance)     CALL your doctor to make appointment in 2 WEEKS     ***CALL YOUR DOCTOR***  if you experience: fever, chills, body aches, or severe pain, swelling, redness, heat or yellow discharge at incision site  If you experience Bleeding or excruciating pain at the procedural site, swelling ( golf ball size) at your procedural site  If you experience CHEST PAIN  If you experience extremity numbness, tingling, temperature change ( of your procedural site)   If you are unable to reach your doctor, you may contact:   -Cardiology Office at [facility] at [phone] or   - [facility] [phone]  - [facility] [phone]

## 2022-09-21 NOTE — ASU DISCHARGE PLAN (ADULT/PEDIATRIC) - CARE PROVIDER_API CALL
Regulo Gallagher)  Cardiology  [address] [place] [address]  Phone: [phone]  Fax: [phone]  Follow Up Time:

## 2022-09-21 NOTE — H&P CARDIOLOGY - HISTORY OF PRESENT ILLNESS
This is a 66 y/o   male with  PMH of HTN, HLD, DM type 2 ( last A1C 5.8% on 07/2022, well managed as per pt on Metformin), CKD ( baseline creat 1.4- 1.5), glaucoma, CAD with recent stents to RCA on 9/9/2022 ( see report below).  Presents here today for staged PCI.       < from: Cardiac Catheterization (09.09.22 @ 12:09) >  Interventional Findings:     Diagnostic Findings:     Coronary Angiography   The coronary circulation is right dominant.      LM   Left main artery: Angiography shows no disease.      LAD   Proximal left anterior descending: There is a 40 % stenosis.      CX   Circumflex: Angiography shows severe atherosclerosis.      RCA   Mid right coronary artery: There is a 100 % stenosis. Proximal right  coronary artery: There is a 70 % stenosis.    Proximal ramus intermedius: There is an 85 % stenosis.      Interventional Details   Mid right coronary artery: The initial stenosis was 100 %. Guidewire  crossing was successful.    A successful Balloon angioplasty was performed using a ADILSON [id number],  a 6FR JR 4.0 LAUNCHER, and a 2.0 X 15  EUPHORA.      The inflation pressure was 16 hilario for the duration of 9.0 seconds.     The inflation pressure was 16 hilario for the duration of 4.0 seconds.     A successful Drug Eluting Stent was deployed using a 5.5FR GUIDELINER,  and a 3.00 X 48 SYNERGY XD.    The inflation pressure was 20 hilario for the duration of 7.0 seconds.     Following intervention there is a 1 % residual stenosis. There was  THOMAS Flow 1 before the procedure and THOMAS Flow 3 following the  procedure.      Proximal right coronary artery: The initial stenosis was 70 %.  Guidewire crossing was successful.    A successful Drug Eluting Stent was deployed using a 6FR JR 4.0  LAUNCHER, a ADILSON [id number], and a 3.50 X 12  SYNERGY XD.      The inflation pressure was 20 hilario for the duration of 7.0 seconds.     A successful Balloon angioplasty was performed using a 3.50 X 12  SYNERGY XD.    The inflation pressure was 20 hilario for the duration of 9.0 seconds.            This is a 68 y/o AA male with PMH of HTN, HLD, DM type 2 ( last A1C 5.8% on 07/2022, well managed as per pt on Metformin), CKD ( baseline creat 1.4- 1.5), glaucoma, CAD with recent stents to RCA on 9/9/2022 ( see report below).  Presents here today for staged PCI.       < from: Cardiac Catheterization (09.09.22 @ 12:09) >  Interventional Findings:     Diagnostic Findings:     Coronary Angiography   The coronary circulation is right dominant.      LM   Left main artery: Angiography shows no disease.      LAD   Proximal left anterior descending: There is a 40 % stenosis.      CX   Circumflex: Angiography shows severe atherosclerosis.      RCA   Mid right coronary artery: There is a 100 % stenosis. Proximal right  coronary artery: There is a 70 % stenosis.    Proximal ramus intermedius: There is an 85 % stenosis.      Interventional Details   Mid right coronary artery: The initial stenosis was 100 %. Guidewire  crossing was successful.    A successful Balloon angioplasty was performed using a ADILSON [id number],  a 6FR JR 4.0 LAUNCHER, and a 2.0 X 15  EUPHORA.      The inflation pressure was 16 hilario for the duration of 9.0 seconds.     The inflation pressure was 16 hilario for the duration of 4.0 seconds.     A successful Drug Eluting Stent was deployed using a 5.5FR GUIDELINER,  and a 3.00 X 48 SYNERGY XD.    The inflation pressure was 20 hilario for the duration of 7.0 seconds.     Following intervention there is a 1 % residual stenosis. There was  THOMAS Flow 1 before the procedure and THOMAS Flow 3 following the  procedure.      Proximal right coronary artery: The initial stenosis was 70 %.  Guidewire crossing was successful.    A successful Drug Eluting Stent was deployed using a 6FR JR 4.0  LAUNCHER, a ADILSON [id number], and a 3.50 X 12  SYNERGY XD.      The inflation pressure was 20 hilario for the duration of 7.0 seconds.     A successful Balloon angioplasty was performed using a 3.50 X 12  SYNERGY XD.    The inflation pressure was 20 hilario for the duration of 9.0 seconds.

## 2022-09-21 NOTE — H&P CARDIOLOGY - NSICDXPASTMEDICALHX_GEN_ALL_CORE_FT
PAST MEDICAL HISTORY:  CAD (coronary artery disease)     Chronic kidney disease (CKD)     Glaucoma     HLD (hyperlipidemia)     HTN (hypertension)     Type 2 diabetes mellitus

## 2022-09-22 PROBLEM — N18.9 CHRONIC KIDNEY DISEASE, UNSPECIFIED: Chronic | Status: ACTIVE | Noted: 2022-09-21

## 2022-09-22 PROBLEM — H40.9 UNSPECIFIED GLAUCOMA: Chronic | Status: ACTIVE | Noted: 2022-09-21

## 2022-10-07 ENCOUNTER — APPOINTMENT (OUTPATIENT)
Dept: MRI IMAGING | Facility: HOSPITAL | Age: 67
End: 2022-10-07

## 2022-10-07 ENCOUNTER — OUTPATIENT (OUTPATIENT)
Dept: OUTPATIENT SERVICES | Facility: HOSPITAL | Age: 67
LOS: 1 days | End: 2022-10-07
Payer: MEDICAID

## 2022-10-07 DIAGNOSIS — Z98.890 OTHER SPECIFIED POSTPROCEDURAL STATES: Chronic | ICD-10-CM

## 2022-10-07 DIAGNOSIS — I42.2 OTHER HYPERTROPHIC CARDIOMYOPATHY: ICD-10-CM

## 2022-10-07 PROCEDURE — A9585: CPT

## 2022-10-07 PROCEDURE — 75561 CARDIAC MRI FOR MORPH W/DYE: CPT

## 2022-10-07 PROCEDURE — 75561 CARDIAC MRI FOR MORPH W/DYE: CPT | Mod: 26

## 2022-10-11 ENCOUNTER — APPOINTMENT (OUTPATIENT)
Dept: PULMONOLOGY | Facility: CLINIC | Age: 67
End: 2022-10-11

## 2022-10-11 DIAGNOSIS — E66.9 OBESITY, UNSPECIFIED: ICD-10-CM

## 2022-10-13 ENCOUNTER — APPOINTMENT (OUTPATIENT)
Dept: PULMONOLOGY | Facility: CLINIC | Age: 67
End: 2022-10-13

## 2022-10-13 PROBLEM — E66.9 OBESITY, CLASS I, BMI 30-34.9: Status: ACTIVE | Noted: 2022-10-13

## 2022-10-13 PROCEDURE — 99214 OFFICE O/P EST MOD 30 MIN: CPT | Mod: 95

## 2022-10-13 NOTE — ASSESSMENT
[FreeTextEntry1] : Plan:\par Will send the patient for an APAP study at the [facility] sleep disorders center to evaluate for the presence of sleep disordered breathing. Explained the rationale for diagnosis and treatment of sleep apnea including its effect on quality of life and long term cardiovascular risk. \par Counseled on the importance of weight loss and its positive impact on the severity of sleep apnea\par Above discussed at length, all questions answered, he appears to understand and will call for results 1 week after completion of the study.\par

## 2022-10-13 NOTE — HISTORY OF PRESENT ILLNESS
[FreeTextEntry1] : 66 yo M presents for initial evaluation of sleep disordered breathing\par Referred by Dr. Anish Gallagher\par Accompanied by his daughter\par PMH: DM2, Apical variant hypertrophic cardiomyopathy, obesity, PCI with BARRINGTON to proximal and mid RCA on 9/9/22, glaucoma\par NKDA\par \par Sleep history: \par Watchpat HST 9/15/22- severe DYAN with an AHI 46/hr and associated with moderate to severe oxygen desaturation\par \par Main complaints of nonrestorative sleep, severe snoring and daytime somnolence. +witnessed apneas\par Wakes 2x to urinate with difficulty falling back to sleep\par Bed: 9 pm, no difficultly falling asleep, wakes 12-1 am to urinate, out of bed at 6-6:30 am. Feels sleepy after breakfast, will doze for 10-15 min around 11 am\par Unrefreshed upon awakening. \par Morning headaches: yes most mornings\par Dry mouth/throat: yes\par Weight trend: stable\par Doesn't drive\par EDS with ESS of 9\par \par Quality Metrics:\par Smoking history: NA\par \par EPWORTH SLEEPINESS SCALE\par 0 = Never would doze\par 1 = Slight chance of dozing\par 2 = Moderate chance of dozing\par 3 = High chance of dozing \par \par Situation/Chance of dozing\par Sitting and reading 3 \par Watching TV 3\par Sitting inactive in a public place (eg a theatre or a meeting) 0\par As a passenger in a car for an hour without a break 0\par Lying down to rest in the afternoon when circumstances permit 3\par Sitting and talking to someone 0\par Sitting quietly after lunch without alcohol 0\par In a car, while stopped for a few minutes in traffic NA\par \par TOTAL SCORE 9\par \par Vaccines: \par COVID:  Pfizer x2, plans \par Influenza: has already received this fall\par Pneumococcal: yes received\par

## 2022-10-13 NOTE — REVIEW OF SYSTEMS
[Obesity] : obesity [Diabetes] : diabetes  [Nocturia] : nocturia [Difficulty Initiating Sleep] : no difficulty falling asleep [Difficulty Maintaining Sleep] : no difficulty maintaining sleep [Lower Extremity Discomfort] : no lower extremity discomfort [Unusual Sleep Behavior] : no unusual sleep behavior [Hypersomnolence] : not sleeping much more than usual [Negative] : Psychiatric [FreeTextEntry3] : per hpi [FreeTextEntry8] : per hpi [FreeTextEntry9] : per hpi [de-identified] : per hpi

## 2022-10-13 NOTE — CONSULT LETTER
[Dear  ___] : Dear  [unfilled], [Consult Letter:] : I had the pleasure of evaluating your patient, [unfilled]. [( Thank you for referring [unfilled] for consultation for _____ )] : Thank you for referring [unfilled] for consultation for [unfilled] [Please see my note below.] : Please see my note below. [Consult Closing:] : Thank you very much for allowing me to participate in the care of this patient.  If you have any questions, please do not hesitate to contact me. [Sincerely,] : Sincerely, [FreeTextEntry2] : Dr. Regulo Gallagher [FreeTextEntry3] : Fern Willett MD, FAASM\par  of Medicine\par Associate , Fellowship in Pulmonary and Critical Care Medicine\par Division of Pulmonary, Critical Care & Sleep Medicine\par [employer] at [facility]\par \par \par

## 2022-10-21 ENCOUNTER — APPOINTMENT (OUTPATIENT)
Dept: CARDIOLOGY | Facility: CLINIC | Age: 67
End: 2022-10-21

## 2022-10-21 VITALS — HEART RATE: 52 BPM | DIASTOLIC BLOOD PRESSURE: 78 MMHG | OXYGEN SATURATION: 98 % | SYSTOLIC BLOOD PRESSURE: 151 MMHG

## 2022-10-21 VITALS — SYSTOLIC BLOOD PRESSURE: 144 MMHG | DIASTOLIC BLOOD PRESSURE: 70 MMHG

## 2022-10-21 PROCEDURE — 99214 OFFICE O/P EST MOD 30 MIN: CPT

## 2022-10-25 NOTE — PHYSICAL EXAM
[Normal Conjunctiva] : normal conjunctiva [Normal S1, S2] : normal S1, S2 [No Murmur] : no murmur [Soft] : abdomen soft [Normal] : alert and oriented, normal memory [de-identified] : Obese

## 2022-10-25 NOTE — ASSESSMENT
[FreeTextEntry1] : Impressions:\par Apical HCM (recent MRI does not suggest any HCM-related LGE)\par s/p PCI to the RCA and Ramus. EF 65% on MRI. \par Type-2 diabetes, obesity\par Essential hypertension, not likely controlled \par Obstructive sleep apnea, severe\par \par Plan:\par Add amlodipine for better control of hypertension\par Start an SGLT2-inhibitor (diabetes with established CAD is an approved FDA indication for cardiovascular event reduction).\par Continue DAPT; in one year may drop the aspirin and maintain on clopidogrel.\par Continue statin\par F/U early 2023\par

## 2022-10-25 NOTE — HISTORY OF PRESENT ILLNESS
[FreeTextEntry1] : F/U: 67 year old man with history of apical HCM, CAD (PCI to RCA for unstable angina followed by PCI to the proximal ramus) and recently diagnosed severe obstructive sleep apnea. \par \par Mr. Shaikh is currently without complaints. He no longer is experiencing agnina. Current management includes aspirin + clopidogrel, atorvastatin, metoprolol + losartan (hypertension), and metformin (type-2 diabetes).

## 2022-11-29 RX ORDER — EMPAGLIFLOZIN 10 MG/1
10 TABLET, FILM COATED ORAL DAILY
Qty: 90 | Refills: 3 | Status: ACTIVE | COMMUNITY
Start: 2022-10-21 | End: 1900-01-01

## 2022-12-12 ENCOUNTER — NON-APPOINTMENT (OUTPATIENT)
Age: 67
End: 2022-12-12

## 2022-12-12 ENCOUNTER — APPOINTMENT (OUTPATIENT)
Dept: SLEEP CENTER | Facility: CLINIC | Age: 67
End: 2022-12-12

## 2023-02-09 ENCOUNTER — NON-APPOINTMENT (OUTPATIENT)
Age: 68
End: 2023-02-09

## 2023-02-11 NOTE — H&P CARDIOLOGY - NSCAFFEINETYPE_GEN_ALL_CORE_SD
Now on asa, plavix, cilostazol  Planning for dispo today  To order DVT prophylaxis if dispo delayed Now on asa, plavix, cilostazol  Planning for dispo today  To consider DVT prophylaxis if dispo delayed coffee

## 2023-03-10 ENCOUNTER — NON-APPOINTMENT (OUTPATIENT)
Age: 68
End: 2023-03-10

## 2023-03-10 ENCOUNTER — APPOINTMENT (OUTPATIENT)
Dept: CARDIOLOGY | Facility: CLINIC | Age: 68
End: 2023-03-10
Payer: MEDICAID

## 2023-03-10 VITALS
DIASTOLIC BLOOD PRESSURE: 64 MMHG | OXYGEN SATURATION: 99 % | HEART RATE: 48 BPM | WEIGHT: 197 LBS | HEIGHT: 64 IN | BODY MASS INDEX: 33.63 KG/M2 | SYSTOLIC BLOOD PRESSURE: 132 MMHG

## 2023-03-10 PROCEDURE — 93000 ELECTROCARDIOGRAM COMPLETE: CPT

## 2023-03-10 PROCEDURE — 99214 OFFICE O/P EST MOD 30 MIN: CPT | Mod: 25

## 2023-03-15 NOTE — CARDIOLOGY SUMMARY
[de-identified] : Sinus bradycardia @ 48/min with one APC.\par T-wave abnormalities c/w known hypertrophic cardiomyopathy.

## 2023-03-15 NOTE — ASSESSMENT
[FreeTextEntry1] : Impression:\par Apical variant hypertrophic cardiomyopathy\par Obstructive sleep apnea\par Coronary artery disease, s/p PCI in the setting of angina (i.e. no infarction). \par \par Recommend:\par Continue current medical regimen\par No contraindication to any ophthalmic procedures, but patient should NOT come off any antiplatelet therapy.

## 2023-03-15 NOTE — REASON FOR VISIT
[FreeTextEntry1] : F/U: 68 year old man with history of apical HCM, CAD (PCI to RCA for unstable angina followed by PCI to the proximal ramus) and recently diagnosed severe obstructive sleep apnea. I last saw him on October 21, 2022. Significant comorbidities include hypertension and type-2 diabetes.\par \par Feeling well; no recurrent angina. Labs at [facility] last week reportedly good (I asked for a copy).

## 2023-03-15 NOTE — PHYSICAL EXAM
[Normal Conjunctiva] : normal conjunctiva [Normal S1, S2] : normal S1, S2 [No Murmur] : no murmur [Clear Lung Fields] : clear lung fields [Soft] : abdomen soft [Normal] : moves all extremities, no focal deficits, normal speech [de-identified] : Obese

## 2023-05-02 ENCOUNTER — NON-APPOINTMENT (OUTPATIENT)
Age: 68
End: 2023-05-02

## 2023-05-09 ENCOUNTER — NON-APPOINTMENT (OUTPATIENT)
Age: 68
End: 2023-05-09

## 2023-06-01 ENCOUNTER — APPOINTMENT (OUTPATIENT)
Dept: PULMONOLOGY | Facility: CLINIC | Age: 68
End: 2023-06-01
Payer: MEDICAID

## 2023-06-01 VITALS
HEART RATE: 53 BPM | RESPIRATION RATE: 17 BRPM | DIASTOLIC BLOOD PRESSURE: 77 MMHG | HEIGHT: 64 IN | OXYGEN SATURATION: 96 % | TEMPERATURE: 97.3 F | SYSTOLIC BLOOD PRESSURE: 167 MMHG | WEIGHT: 199 LBS | BODY MASS INDEX: 33.97 KG/M2

## 2023-06-01 PROCEDURE — 99214 OFFICE O/P EST MOD 30 MIN: CPT

## 2023-06-01 NOTE — REVIEW OF SYSTEMS
[Obesity] : obesity [Diabetes] : diabetes  [Nocturia] : nocturia [Negative] : Psychiatric [Difficulty Initiating Sleep] : no difficulty falling asleep [Difficulty Maintaining Sleep] : no difficulty maintaining sleep [Lower Extremity Discomfort] : no lower extremity discomfort [Unusual Sleep Behavior] : no unusual sleep behavior [Hypersomnolence] : not sleeping much more than usual [FreeTextEntry3] : per hpi [FreeTextEntry8] : per hpi [FreeTextEntry9] : per hpi [de-identified] : per hpi

## 2023-06-01 NOTE — HISTORY OF PRESENT ILLNESS
[Obstructive Sleep Apnea] : obstructive sleep apnea [FreeTextEntry1] : 67 yo M presents for initial evaluation of sleep disordered breathing\par Referred by Dr. Anish Gallagher\par Accompanied by his daughter\par PMH: DM2, Apical variant hypertrophic cardiomyopathy, obesity, PCI with BARRINGTON to proximal and mid RCA on 9/9/22, glaucoma\par NKDA\par \par Sleep history: \par Watchpat HST 9/15/22- severe DYAN with an AHI 46/hr and associated with moderate to severe oxygen desaturation\par \par Main complaints of nonrestorative sleep, severe snoring and daytime somnolence. +witnessed apneas\par Wakes 2x to urinate with difficulty falling back to sleep\par Bed: 9 pm, no difficultly falling asleep, wakes 12-1 am to urinate, out of bed at 6-6:30 am. Feels sleepy after breakfast, will doze for 10-15 min around 11 am\par Unrefreshed upon awakening. \par Morning headaches: yes most mornings\par Dry mouth/throat: yes\par Weight trend: stable\par Doesn't drive\par EDS with ESS of 9\par \par Quality Metrics:\par Smoking history: NA\par \par EPWORTH SLEEPINESS SCALE\par 0 = Never would doze\par 1 = Slight chance of dozing\par 2 = Moderate chance of dozing\par 3 = High chance of dozing \par \par Situation/Chance of dozing\par Sitting and reading 3 \par Watching TV 3\par Sitting inactive in a public place (eg a theatre or a meeting) 0\par As a passenger in a car for an hour without a break 0\par Lying down to rest in the afternoon when circumstances permit 3\par Sitting and talking to someone 0\par Sitting quietly after lunch without alcohol 0\par In a car, while stopped for a few minutes in traffic NA\par \par TOTAL SCORE 9\par \par Vaccines: \par COVID:  Pfizer x2, plans \par Influenza: has already received this fall\par Pneumococcal: yes received\par \par Follow up OV 6/1/23: Severe DYAN\par Started APAP therapy 3/17- had issues with machine and mask fit, a lot of leakage\par Replacement machine provided on 5/24/23 - new Respironics dreamwear nasal mask which he has tried using a few nights however states that when he turns in his sleep there is a lot mask leakage which disrupts his sleep\par \par

## 2023-06-01 NOTE — CONSULT LETTER
[Dear  ___] : Dear  [unfilled], [Please see my note below.] : Please see my note below. [Consult Closing:] : Thank you very much for allowing me to participate in the care of this patient.  If you have any questions, please do not hesitate to contact me. [Sincerely,] : Sincerely, [Courtesy Letter:] : I had the pleasure of seeing your patient, [unfilled], in my office today. [FreeTextEntry2] : Dr. Regulo Gallagher [FreeTextEntry3] : Fern Willett MD, FAASM\par  of Medicine\par Associate , Fellowship in Pulmonary and Critical Care Medicine\par Division of Pulmonary, Critical Care & Sleep Medicine\par [employer] at [facility]\par \par \par

## 2023-06-01 NOTE — ASSESSMENT
[FreeTextEntry1] : 69 yo M with HOCM, CAD, severe DYAN recently started on APAP therapy\par A/P: \par Fitted with Eson 2 nasal mask size medium\par Will try this as well as continue with Dreamwear nasal mask\par Follow up Televisit in 4-6 weeks, will call with issues earlier\par If continues to have difficulty tolerating, will send for daytime acclimitization study.

## 2023-09-15 ENCOUNTER — NON-APPOINTMENT (OUTPATIENT)
Age: 68
End: 2023-09-15

## 2023-09-15 ENCOUNTER — APPOINTMENT (OUTPATIENT)
Dept: CARDIOLOGY | Facility: CLINIC | Age: 68
End: 2023-09-15
Payer: MEDICAID

## 2023-09-15 VITALS
BODY MASS INDEX: 33.63 KG/M2 | HEIGHT: 64 IN | SYSTOLIC BLOOD PRESSURE: 123 MMHG | HEART RATE: 61 BPM | DIASTOLIC BLOOD PRESSURE: 80 MMHG | WEIGHT: 197 LBS | OXYGEN SATURATION: 99 %

## 2023-09-15 VITALS
WEIGHT: 197 LBS | BODY MASS INDEX: 33.63 KG/M2 | DIASTOLIC BLOOD PRESSURE: 80 MMHG | HEIGHT: 64 IN | SYSTOLIC BLOOD PRESSURE: 123 MMHG

## 2023-09-15 PROCEDURE — 99215 OFFICE O/P EST HI 40 MIN: CPT | Mod: 25

## 2023-09-15 PROCEDURE — 93000 ELECTROCARDIOGRAM COMPLETE: CPT

## 2023-09-19 RX ORDER — CLOPIDOGREL BISULFATE 75 MG/1
75 TABLET, FILM COATED ORAL
Qty: 90 | Refills: 3 | Status: DISCONTINUED | COMMUNITY
Start: 2022-09-01 | End: 2023-09-19

## 2023-09-19 RX ORDER — ICOSAPENT ETHYL 1 G/1
1 CAPSULE ORAL
Qty: 360 | Refills: 3 | Status: ACTIVE | COMMUNITY
Start: 2023-09-19 | End: 1900-01-01

## 2023-10-07 ENCOUNTER — APPOINTMENT (OUTPATIENT)
Dept: CARDIOLOGY | Facility: CLINIC | Age: 68
End: 2023-10-07

## 2023-10-13 ENCOUNTER — APPOINTMENT (OUTPATIENT)
Dept: CARDIOLOGY | Facility: CLINIC | Age: 68
End: 2023-10-13

## 2023-10-13 ENCOUNTER — APPOINTMENT (OUTPATIENT)
Dept: CV DIAGNOSITCS | Facility: HOSPITAL | Age: 68
End: 2023-10-13

## 2023-10-13 ENCOUNTER — RX RENEWAL (OUTPATIENT)
Age: 68
End: 2023-10-13

## 2023-10-13 RX ORDER — AMLODIPINE BESYLATE 5 MG/1
5 TABLET ORAL DAILY
Qty: 90 | Refills: 3 | Status: ACTIVE | COMMUNITY
Start: 2022-10-21 | End: 1900-01-01

## 2023-11-17 ENCOUNTER — OUTPATIENT (OUTPATIENT)
Dept: OUTPATIENT SERVICES | Facility: HOSPITAL | Age: 68
LOS: 1 days | End: 2023-11-17
Payer: MEDICAID

## 2023-11-17 ENCOUNTER — APPOINTMENT (OUTPATIENT)
Dept: ELECTROPHYSIOLOGY | Facility: CLINIC | Age: 68
End: 2023-11-17
Payer: MEDICAID

## 2023-11-17 ENCOUNTER — NON-APPOINTMENT (OUTPATIENT)
Age: 68
End: 2023-11-17

## 2023-11-17 ENCOUNTER — APPOINTMENT (OUTPATIENT)
Dept: CV DIAGNOSITCS | Facility: HOSPITAL | Age: 68
End: 2023-11-17

## 2023-11-17 ENCOUNTER — APPOINTMENT (OUTPATIENT)
Dept: CARDIOLOGY | Facility: CLINIC | Age: 68
End: 2023-11-17
Payer: MEDICAID

## 2023-11-17 VITALS
OXYGEN SATURATION: 98 % | WEIGHT: 197 LBS | HEART RATE: 62 BPM | HEIGHT: 64 IN | DIASTOLIC BLOOD PRESSURE: 86 MMHG | SYSTOLIC BLOOD PRESSURE: 147 MMHG | BODY MASS INDEX: 33.63 KG/M2

## 2023-11-17 DIAGNOSIS — Z98.890 OTHER SPECIFIED POSTPROCEDURAL STATES: Chronic | ICD-10-CM

## 2023-11-17 DIAGNOSIS — I42.2 OTHER HYPERTROPHIC CARDIOMYOPATHY: ICD-10-CM

## 2023-11-17 PROCEDURE — 93000 ELECTROCARDIOGRAM COMPLETE: CPT

## 2023-11-17 PROCEDURE — 99215 OFFICE O/P EST HI 40 MIN: CPT

## 2023-11-17 PROCEDURE — 93306 TTE W/DOPPLER COMPLETE: CPT | Mod: 26

## 2023-11-17 PROCEDURE — C8929: CPT

## 2023-11-27 ENCOUNTER — APPOINTMENT (OUTPATIENT)
Dept: PULMONOLOGY | Facility: CLINIC | Age: 68
End: 2023-11-27
Payer: MEDICAID

## 2023-11-27 PROCEDURE — 99213 OFFICE O/P EST LOW 20 MIN: CPT | Mod: 95

## 2023-12-04 ENCOUNTER — NON-APPOINTMENT (OUTPATIENT)
Age: 68
End: 2023-12-04

## 2023-12-13 PROCEDURE — 93248 EXT ECG>7D<15D REV&INTERPJ: CPT

## 2023-12-27 ENCOUNTER — OUTPATIENT (OUTPATIENT)
Dept: OUTPATIENT SERVICES | Facility: HOSPITAL | Age: 68
LOS: 1 days | End: 2023-12-27
Payer: MEDICAID

## 2023-12-27 DIAGNOSIS — Z98.890 OTHER SPECIFIED POSTPROCEDURAL STATES: Chronic | ICD-10-CM

## 2023-12-27 RX ORDER — LOSARTAN POTASSIUM 100 MG/1
1 TABLET, FILM COATED ORAL
Qty: 0 | Refills: 0 | DISCHARGE

## 2023-12-27 RX ORDER — DORZOLAMIDE HYDROCHLORIDE TIMOLOL MALEATE 20; 5 MG/ML; MG/ML
1 SOLUTION/ DROPS OPHTHALMIC
Qty: 0 | Refills: 0 | DISCHARGE

## 2023-12-27 RX ORDER — METFORMIN HYDROCHLORIDE 850 MG/1
1 TABLET ORAL
Qty: 0 | Refills: 0 | DISCHARGE

## 2023-12-27 RX ORDER — ATORVASTATIN CALCIUM 80 MG/1
1 TABLET, FILM COATED ORAL
Qty: 0 | Refills: 0 | DISCHARGE

## 2023-12-27 RX ORDER — CLOPIDOGREL BISULFATE 75 MG/1
1 TABLET, FILM COATED ORAL
Qty: 0 | Refills: 0 | DISCHARGE

## 2023-12-27 NOTE — ASU PATIENT PROFILE, ADULT - IS PATIENT PREGNANT?
There is drug interaction with paxlovid and clonazepam. Would recommend avoiding use of her clonazepam while on paxlovid or would need to discuss with her psychiatrist reducing the dose if she still wants to take with paxlovid.    not applicable (Male)

## 2023-12-28 ENCOUNTER — NON-APPOINTMENT (OUTPATIENT)
Age: 68
End: 2023-12-28

## 2023-12-28 ENCOUNTER — OUTPATIENT (OUTPATIENT)
Dept: OUTPATIENT SERVICES | Facility: HOSPITAL | Age: 68
LOS: 1 days | End: 2023-12-28
Payer: MEDICAID

## 2023-12-28 ENCOUNTER — TRANSCRIPTION ENCOUNTER (OUTPATIENT)
Age: 68
End: 2023-12-28

## 2023-12-28 DIAGNOSIS — Z98.890 OTHER SPECIFIED POSTPROCEDURAL STATES: Chronic | ICD-10-CM

## 2023-12-28 PROCEDURE — 82962 GLUCOSE BLOOD TEST: CPT

## 2023-12-28 PROCEDURE — 85730 THROMBOPLASTIN TIME PARTIAL: CPT

## 2023-12-28 PROCEDURE — C1887: CPT

## 2023-12-28 PROCEDURE — 80048 BASIC METABOLIC PNL TOTAL CA: CPT

## 2023-12-28 PROCEDURE — C8924: CPT

## 2023-12-28 PROCEDURE — 85610 PROTHROMBIN TIME: CPT

## 2023-12-28 PROCEDURE — 85027 COMPLETE CBC AUTOMATED: CPT

## 2023-12-28 PROCEDURE — 84484 ASSAY OF TROPONIN QUANT: CPT

## 2023-12-28 PROCEDURE — C1894: CPT

## 2023-12-28 PROCEDURE — 83036 HEMOGLOBIN GLYCOSYLATED A1C: CPT

## 2023-12-28 PROCEDURE — 85379 FIBRIN DEGRADATION QUANT: CPT

## 2023-12-28 PROCEDURE — C1769: CPT

## 2023-12-28 PROCEDURE — 36415 COLL VENOUS BLD VENIPUNCTURE: CPT

## 2023-12-28 PROCEDURE — 82553 CREATINE MB FRACTION: CPT

## 2023-12-28 PROCEDURE — 93321 DOPPLER ECHO F-UP/LMTD STD: CPT

## 2023-12-28 PROCEDURE — 93458 L HRT ARTERY/VENTRICLE ANGIO: CPT

## 2023-12-28 PROCEDURE — 82550 ASSAY OF CK (CPK): CPT

## 2023-12-28 PROCEDURE — 80053 COMPREHEN METABOLIC PANEL: CPT

## 2023-12-28 PROCEDURE — 85025 COMPLETE CBC W/AUTO DIFF WBC: CPT

## 2023-12-28 PROCEDURE — 71045 X-RAY EXAM CHEST 1 VIEW: CPT

## 2023-12-28 PROCEDURE — 83880 ASSAY OF NATRIURETIC PEPTIDE: CPT

## 2023-12-29 NOTE — HISTORY OF PRESENT ILLNESS
[FreeTextEntry1] : 69 yo M with severe DYAN on APAP therapy presents for follow up.  Referred by Dr. Anish Gallagher Accompanied by his daughter PMH: DM2, Apical variant hypertrophic cardiomyopathy, obesity, PCI with BARRINGTON to proximal and mid RCA on 9/9/22, glaucoma NKDA  Sleep history:  Watchpat HST 9/15/22- severe DYAN with an AHI 46/hr and associated with moderate to severe oxygen desaturation  Main complaints of nonrestorative sleep, severe snoring and daytime somnolence. +witnessed apneas Wakes 2x to urinate with difficulty falling back to sleep Bed: 9 pm, no difficultly falling asleep, wakes 12-1 am to urinate, out of bed at 6-6:30 am. Feels sleepy after breakfast, will doze for 10-15 min around 11 am Unrefreshed upon awakening.  Morning headaches: yes most mornings Dry mouth/throat: yes Weight trend: stable Doesn't drive EDS with ESS of 9  Quality Metrics: Smoking history: NA  EPWORTH SLEEPINESS SCALE 0 = Never would doze 1 = Slight chance of dozing 2 = Moderate chance of dozing 3 = High chance of dozing   Situation/Chance of dozing Sitting and reading 3  Watching TV 3 Sitting inactive in a public place (eg a theatre or a meeting) 0 As a passenger in a car for an hour without a break 0 Lying down to rest in the afternoon when circumstances permit 3 Sitting and talking to someone 0 Sitting quietly after lunch without alcohol 0 In a car, while stopped for a few minutes in traffic NA  TOTAL SCORE 9  Vaccines:  COVID:  Pfizer x2, plans  Influenza: has already received this fall Pneumococcal: yes received  Follow up OV 6/1/23: Severe DYAN Started APAP therapy 3/17- had issues with machine and mask fit, a lot of leakage Replacement machine provided on 5/24/23 - new Respironics dreamwear nasal mask which he has tried using a few nights however states that when he turns in his sleep there is a lot mask leakage which disrupts his sleep  Follow up [id number]/27/23 for severe DYAN : Accompanied by his daughter Has not used APAP since 9/23 - data review shows that when he was using it, minimal mask leak and treatment AHI was 3.4/hr. He was having a lot of issues tolerating CPAP and the machine was returned to [facility] for noncompliance Asking about Inspire therapy

## 2023-12-29 NOTE — REASON FOR VISIT
[Home] : at home, [unfilled] , at the time of the visit. [Medical Office: ([facility] [place]___] : at the medical office located in  [Family Member] : family member [Patient] : the patient

## 2023-12-29 NOTE — REVIEW OF SYSTEMS
[Difficulty Initiating Sleep] : no difficulty falling asleep [Difficulty Maintaining Sleep] : no difficulty maintaining sleep [Lower Extremity Discomfort] : no lower extremity discomfort [Unusual Sleep Behavior] : no unusual sleep behavior [Hypersomnolence] : not sleeping much more than usual [FreeTextEntry3] : per hpi [FreeTextEntry8] : per hpi [FreeTextEntry9] : per hpi [de-identified] : per hpi

## 2023-12-29 NOTE — ASSESSMENT
[FreeTextEntry1] : A/Plan: Patient has severe DYAN, comorbid cardiac conditions  Unable to tolerate APAP therapy Will refer for Inspire evaluation with Dr. Rondon, provided with contact information to schedule consultation

## 2023-12-29 NOTE — CONSULT LETTER
[FreeTextEntry2] : Dr. Regulo Gallagher [FreeTextEntry3] : Fern Willett MD, FAASM\par   of Medicine\par  Associate , Fellowship in Pulmonary and Critical Care Medicine\par  Division of Pulmonary, Critical Care & Sleep Medicine\par  [employer] at [facility]\par  \par  \par

## 2024-01-04 ENCOUNTER — APPOINTMENT (OUTPATIENT)
Dept: OTOLARYNGOLOGY | Facility: CLINIC | Age: 69
End: 2024-01-04
Payer: MEDICAID

## 2024-01-04 ENCOUNTER — NON-APPOINTMENT (OUTPATIENT)
Age: 69
End: 2024-01-04

## 2024-01-04 VITALS
DIASTOLIC BLOOD PRESSURE: 82 MMHG | SYSTOLIC BLOOD PRESSURE: 153 MMHG | HEART RATE: 72 BPM | BODY MASS INDEX: 33.63 KG/M2 | TEMPERATURE: 96.2 F | HEIGHT: 64 IN | WEIGHT: 197 LBS

## 2024-01-04 DIAGNOSIS — Z91.89 OTHER SPECIFIED PERSONAL RISK FACTORS, NOT ELSEWHERE CLASSIFIED: ICD-10-CM

## 2024-01-04 DIAGNOSIS — G47.33 OBSTRUCTIVE SLEEP APNEA (ADULT) (PEDIATRIC): ICD-10-CM

## 2024-01-04 PROCEDURE — 31575 DIAGNOSTIC LARYNGOSCOPY: CPT

## 2024-01-04 PROCEDURE — 99204 OFFICE O/P NEW MOD 45 MIN: CPT | Mod: 25

## 2024-01-11 DIAGNOSIS — Z00.00 ENCOUNTER FOR GENERAL ADULT MEDICAL EXAMINATION WITHOUT ABNORMAL FINDINGS: ICD-10-CM

## 2024-01-11 DIAGNOSIS — I24.9 ACUTE ISCHEMIC HEART DISEASE, UNSPECIFIED: ICD-10-CM

## 2024-01-12 ENCOUNTER — APPOINTMENT (OUTPATIENT)
Dept: CARDIOLOGY | Facility: CLINIC | Age: 69
End: 2024-01-12
Payer: MEDICAID

## 2024-01-12 ENCOUNTER — NON-APPOINTMENT (OUTPATIENT)
Age: 69
End: 2024-01-12

## 2024-01-12 VITALS
SYSTOLIC BLOOD PRESSURE: 158 MMHG | BODY MASS INDEX: 33.63 KG/M2 | DIASTOLIC BLOOD PRESSURE: 94 MMHG | WEIGHT: 197 LBS | HEIGHT: 64 IN

## 2024-01-12 VITALS
WEIGHT: 197 LBS | HEART RATE: 88 BPM | DIASTOLIC BLOOD PRESSURE: 94 MMHG | HEIGHT: 64 IN | OXYGEN SATURATION: 97 % | SYSTOLIC BLOOD PRESSURE: 158 MMHG | BODY MASS INDEX: 33.63 KG/M2

## 2024-01-12 PROCEDURE — 99215 OFFICE O/P EST HI 40 MIN: CPT

## 2024-01-12 PROCEDURE — 93000 ELECTROCARDIOGRAM COMPLETE: CPT

## 2024-01-16 DIAGNOSIS — I24.9 ACUTE ISCHEMIC HEART DISEASE, UNSPECIFIED: ICD-10-CM

## 2024-01-30 NOTE — REASON FOR VISIT
[FreeTextEntry1] : 68-year-old man with apical variant HCM, type-2 diabetes, severe DYAN, and CAD  Was in the ER for one evening after having presented with a chest-pain syndrome different than his angina. Underwent angiography which demonstrated nothing new.  BP at home usually in the 135-145 range. Feels well today. Still has not had treatment initiated for DYAN.

## 2024-01-30 NOTE — ASSESSMENT
[FreeTextEntry1] : Impression: Apical variant HCM Hypertension, which probably needs better control.  Type-2 diabetes.  Severe DYAN.  Plan: Consider adding 2.5 mg amlodipine to the regimen.  F/U with sleep medicine Continue current medical regimen.   total time 50 minutes.

## 2024-01-30 NOTE — PHYSICAL EXAM
[Normal S1, S2] : normal S1, S2 [No Murmur] : no murmur [Soft] : abdomen soft [Normal] : moves all extremities, no focal deficits, normal speech [de-identified] : Obese [de-identified] : Large circumference

## 2024-01-30 NOTE — CARDIOLOGY SUMMARY
[de-identified] : Sinus rhythm with first degree AV block ( msec), rate 87/min. Left ventricular hypertrophy with repolarization abnormality, c/w known apical variant HCM.

## 2024-02-06 ENCOUNTER — NON-APPOINTMENT (OUTPATIENT)
Age: 69
End: 2024-02-06

## 2024-03-01 VITALS
OXYGEN SATURATION: 98 % | TEMPERATURE: 98 F | DIASTOLIC BLOOD PRESSURE: 94 MMHG | HEART RATE: 90 BPM | HEIGHT: 65 IN | SYSTOLIC BLOOD PRESSURE: 153 MMHG | WEIGHT: 192.02 LBS | RESPIRATION RATE: 16 BRPM

## 2024-03-01 RX ORDER — NITROGLYCERIN 6.5 MG
1 CAPSULE, EXTENDED RELEASE ORAL
Refills: 0 | DISCHARGE

## 2024-03-01 NOTE — ASU PATIENT PROFILE, ADULT - NSICDXPASTMEDICALHX_GEN_ALL_CORE_FT
PAST MEDICAL HISTORY:  CAD (coronary artery disease)     Chronic kidney disease (CKD)     Glaucoma     HLD (hyperlipidemia)     HTN (hypertension)     Type 2 diabetes mellitus      PAST MEDICAL HISTORY:  CAD (coronary artery disease) CARDIAC STENT X4    Chronic kidney disease (CKD)     Glaucoma     HLD (hyperlipidemia)     HTN (hypertension)     Type 2 diabetes mellitus      PAST MEDICAL HISTORY:  CAD (coronary artery disease) CARDIAC STENT X4    Chronic kidney disease (CKD)     Glaucoma     HLD (hyperlipidemia)     HTN (hypertension)     DYAN (obstructive sleep apnea)     Stage 3 chronic kidney disease     Type 2 diabetes mellitus

## 2024-03-04 ENCOUNTER — APPOINTMENT (OUTPATIENT)
Dept: OTOLARYNGOLOGY | Facility: HOSPITAL | Age: 69
End: 2024-03-04

## 2024-03-04 ENCOUNTER — NON-APPOINTMENT (OUTPATIENT)
Age: 69
End: 2024-03-04

## 2024-03-04 ENCOUNTER — OUTPATIENT (OUTPATIENT)
Dept: INPATIENT UNIT | Facility: HOSPITAL | Age: 69
LOS: 1 days | Discharge: HOME | End: 2024-03-04
Payer: MEDICAID

## 2024-03-04 ENCOUNTER — TRANSCRIPTION ENCOUNTER (OUTPATIENT)
Age: 69
End: 2024-03-04

## 2024-03-04 VITALS
HEART RATE: 71 BPM | RESPIRATION RATE: 15 BRPM | DIASTOLIC BLOOD PRESSURE: 74 MMHG | OXYGEN SATURATION: 96 % | SYSTOLIC BLOOD PRESSURE: 123 MMHG

## 2024-03-04 DIAGNOSIS — Z98.890 OTHER SPECIFIED POSTPROCEDURAL STATES: Chronic | ICD-10-CM

## 2024-03-04 LAB
GLUCOSE BLDC GLUCOMTR-MCNC: 95 MG/DL — SIGNIFICANT CHANGE UP (ref 70–99)
GLUCOSE BLDC GLUCOMTR-MCNC: 96 MG/DL — SIGNIFICANT CHANGE UP (ref 70–99)

## 2024-03-04 PROCEDURE — 42975 DISE EVAL SLP DO BRTH FLX DX: CPT

## 2024-03-04 PROCEDURE — 42975 DISE EVAL SLP DO BRTH FLX DX: CPT | Mod: GC

## 2024-03-04 PROCEDURE — 82962 GLUCOSE BLOOD TEST: CPT

## 2024-03-04 RX ORDER — RIVAROXABAN 15 MG-20MG
1 KIT ORAL
Refills: 0 | DISCHARGE

## 2024-03-04 RX ORDER — ACETAMINOPHEN 500 MG
2 TABLET ORAL
Refills: 0 | DISCHARGE

## 2024-03-04 RX ORDER — METFORMIN HYDROCHLORIDE 850 MG/1
1 TABLET ORAL
Refills: 0 | DISCHARGE

## 2024-03-04 RX ORDER — LOSARTAN/HYDROCHLOROTHIAZIDE 100MG-25MG
1 TABLET ORAL
Refills: 0 | DISCHARGE

## 2024-03-04 RX ORDER — ACETAMINOPHEN 500 MG
650 TABLET ORAL EVERY 6 HOURS
Refills: 0 | Status: DISCONTINUED | OUTPATIENT
Start: 2024-03-04 | End: 2024-03-04

## 2024-03-04 NOTE — HISTORY OF PRESENT ILLNESS
[de-identified] : Mr. ESPINAL is a 68-year-old man who was referred by Dr. Willett for Inspire hypoglossal nerve stimulator implant consultation. His daughter Shamika participated in the consultation by phone. PMH:  DM2, Apical variant hypertrophic cardiomyopathy, DYAN, obesity, PCI with BARRINGTON to proximal and mid RCA on 9/9/22, glaucoma PCP: Vic Luque MD Cardiologist:  Regulo Gallagher MD  He reports many years of chronic loud snoring, witnessed apneas, nighttime awakenings, morning headache and daytime somnolence.  He does not feel refreshed when wakes from sleep. [place] Sleepiness Scale: 3 He was diagnosed with severe DYAN in 2022.  First used CPAP in March 2023.  In Sept 2023, he got a replacement machine due to issues with machine and mask fit.  With Dreamwear nasal mask, he still had a lot of air leakage when he turned in sleep.  He stopped using CPAP in 9/2023, and the machine was returned due to noncompliance. He denies nasal congestion/obstruction. He is interested in Inspire hypoglossal nerve stimulator implant but did not realize that he would require invasive surgery.   HOME SLEEP STUDY (9/15/2022), read at [facility]  -  Severe DYAN -  AHI 46  - Snoring noted - O2 sat mean 94%, librado 79%.  O2 sat =/< 88% for 21 minutes of sleep time. - HR avg 61 bpm

## 2024-03-04 NOTE — CONSULT LETTER
[FreeTextEntry2] : Fern Willett MD Pulmonary and Sleep Medicine [facility]  [FreeTextEntry3] :  Ruma Lanier MD  Otolaryngology, Head and Neck Surgery

## 2024-03-04 NOTE — BRIEF OPERATIVE NOTE - OPERATION/FINDINGS
circumferential collapse at velum, base of tongue in contact with epiglottis. These findings do not improve with jaw thrust. Unfavorable for hypoglossal nerve stimulator

## 2024-03-04 NOTE — ASSESSMENT
[FreeTextEntry1] : Mr. ESPINAL is a 68-year-old man who has severe DYAN and was not able to tolerate CPAP. BMI 33.8.  Tonsils small.  Mallampati 3 airway. He may be a candidate for Inspire hypoglossal nerve stimulator implant to treat his DYAN. We discussed the CN12 implant surgery, which he did not realize required invasive surgery.  He is not sure if he wants to have an operation. He did agree to have drug-induced sleep endoscopy (DISE) to assess his airway for benefit from Inspire.  The DISE does not commit him to having the Inspire implant surgery.  His daughter (on phone) was included in this discussion. The procedure, risks and benefits were discussed with the patient adn his daughter.  Risks, include but are not limited to, bleeding, infection, difficulty breathing, and throat irritation.  The procedure is diagnostic, not therapeutic, and does not commit him to the CN12 stimulator implant. His questions were answered, and he agreed to proceed. DISE is scheduled for March 4,2024, at [facility]. Medical evaluation and optimization will be requested.

## 2024-03-04 NOTE — PROCEDURE
[de-identified] : LARYNGOSCOPY EXAM:  Indication:  obstructive sleep apnea  -Verbal consent was obtained from patient prior to procedure. -Oxymetazoline 0.05% spray applied to the nasal cavities. Flexible laryngoscopy was performed via nostril and revealed the following:   -- Nasopharynx had no mass or exudate.   -- Base of tongue was symmetric and not enlarged.   -- Vallecula was clear   -- Epiglottis, both aryepiglottic folds and both false vocal folds were normal   -- Arytenoids both without edema and erythema    -- True vocal folds were fully mobile and without lesions.    -- Post cricoid area was clear.   -- Interarytenoid edema was absent    -- No lesions seen in laryngopharynx   -- could not seal for modified Julio maneuver  The patient tolerated the procedure well.

## 2024-03-04 NOTE — ASU DISCHARGE PLAN (ADULT/PEDIATRIC) - ASU DC SPECIAL INSTRUCTIONSFT
Procedure findings not favorable for hypoglossal nerve stimulator. Follow up with post operative appointment as scheduled

## 2024-03-04 NOTE — REVIEW OF SYSTEMS
[Patient Intake Form Reviewed] : Patient intake form was reviewed [Feeling Tired] : feeling tired [Negative] : Heme/Lymph

## 2024-03-04 NOTE — PHYSICAL EXAM
[FreeTextEntry1] : No hoarseness.  [Normal] : mucosa is normal [Midline] : trachea located in midline position [Laryngoscopy Performed] : laryngoscopy was performed, see procedure section for findings [de-identified] : Mallampati 3 airway [de-identified] : small.

## 2024-03-04 NOTE — PACU DISCHARGE NOTE - NSCLINEINSERTRD_GEN_ALL_CORE
Patient came in for 1 allergy injection(s). Patient had no reaction to last shots. I gave 0.4 ml of the red bottle Tree subcutaneous without any issues.   Patient instructed to wait in building for at least 30 min. after injections  Patient comes into clinic today at the request of Dr. Vides for allergy injections     This service provided today was under the direct supervision of Dr. Vides, who was available if needed.  
No

## 2024-03-04 NOTE — ASU DISCHARGE PLAN (ADULT/PEDIATRIC) - NS MD DC FALL RISK RISK
For information on Fall & Injury Prevention, visit: [email or web address] OR  [email or web address] OR  https://www.cdc.gov/steadi/patient.html

## 2024-03-04 NOTE — ASU DISCHARGE PLAN (ADULT/PEDIATRIC) - CARE PROVIDER_API CALL
Ruma Lanier  Otolaryngology  [address]  [place] [address]  Phone: [phone]  Fax: [phone]  Established Patient  Follow Up Time:

## 2024-04-16 ENCOUNTER — RX RENEWAL (OUTPATIENT)
Age: 69
End: 2024-04-16

## 2024-04-16 NOTE — PRE-OP CHECKLIST - VIA
Alert-The patient is alert, awake and responds to voice. The patient is oriented to time, place, and person. The triage nurse is able to obtain subjective information. wheelchair

## 2024-05-09 ENCOUNTER — APPOINTMENT (OUTPATIENT)
Dept: CARDIOLOGY | Facility: CLINIC | Age: 69
End: 2024-05-09
Payer: MEDICAID

## 2024-05-09 ENCOUNTER — NON-APPOINTMENT (OUTPATIENT)
Age: 69
End: 2024-05-09

## 2024-05-09 VITALS
HEIGHT: 64 IN | HEART RATE: 77 BPM | BODY MASS INDEX: 32.27 KG/M2 | OXYGEN SATURATION: 96 % | SYSTOLIC BLOOD PRESSURE: 116 MMHG | DIASTOLIC BLOOD PRESSURE: 76 MMHG | WEIGHT: 189 LBS

## 2024-05-09 PROBLEM — I25.10 ATHEROSCLEROTIC HEART DISEASE OF NATIVE CORONARY ARTERY WITHOUT ANGINA PECTORIS: Chronic | Status: ACTIVE | Noted: 2022-07-20

## 2024-05-09 PROBLEM — N18.30 CHRONIC KIDNEY DISEASE, STAGE 3 UNSPECIFIED: Chronic | Status: ACTIVE | Noted: 2024-03-04

## 2024-05-09 PROBLEM — G47.33 OBSTRUCTIVE SLEEP APNEA (ADULT) (PEDIATRIC): Chronic | Status: ACTIVE | Noted: 2024-03-04

## 2024-05-09 PROCEDURE — 99215 OFFICE O/P EST HI 40 MIN: CPT

## 2024-05-09 PROCEDURE — 93000 ELECTROCARDIOGRAM COMPLETE: CPT

## 2024-05-09 RX ORDER — SEMAGLUTIDE 1.34 MG/ML
4 INJECTION, SOLUTION SUBCUTANEOUS WEEKLY
Qty: 8 | Refills: 0 | Status: ACTIVE | COMMUNITY
Start: 2023-11-17 | End: 1900-01-01

## 2024-05-09 RX ORDER — RIVAROXABAN 2.5 MG/1
2.5 TABLET, FILM COATED ORAL
Qty: 180 | Refills: 3 | Status: ACTIVE | COMMUNITY
Start: 2023-09-19 | End: 1900-01-01

## 2024-06-09 NOTE — CARDIOLOGY SUMMARY
[de-identified] : Sinus rhythm @ 80/min with first degree AV block ( msec). Left ventricular hypertrophy with diffuse T-wave inversions, c/w known apical hypertrophic cardiomyopathy.

## 2024-06-09 NOTE — PHYSICAL EXAM
[No Murmur] : no murmur [Normal Conjunctiva] : normal conjunctiva [Normal Venous Pressure] : normal venous pressure [No Carotid Bruit] : no carotid bruit [Normal S1, S2] : normal S1, S2 [Normal] : alert and oriented, normal memory [de-identified] : Obese

## 2024-06-09 NOTE — ASSESSMENT
[FreeTextEntry1] : Impressions: Coronary artery disease, with h/o PCI to the RCA Apical variant hypertrophic cardiomyopathy.  Obesity, hypertension, type-2 diabetes mellitus, Severe obstructive sleep apnea,  Plan: Obtain recent labs (last ones in chart are from late 2022) Continue current medical regimen.  Needs to follow-up with sleep medicine.   Total time 45 minutes.

## 2024-06-09 NOTE — REVIEW OF SYSTEMS
[Dyspnea on exertion] : not dyspnea during exertion [Chest Discomfort] : no chest discomfort [Palpitations] : no palpitations [Syncope] : no syncope [Easy Bleeding] : no tendency for easy bleeding [Easy Bruising] : no tendency for easy bruising

## 2024-06-09 NOTE — HISTORY OF PRESENT ILLNESS
[FreeTextEntry1] : Follow-up: 69-year-old man with a history of non-obstructive HCM, DYAN (not a candidate for Inspire device), hypertension, type-2 diabetes, obesity, PAF, and CAD (s/p PCI to the RCA). CAD is managed with Xarelto 2.5 mg bid + aspirin 81 mg/d. denies any propensity to bleed. Feels well at present.

## 2024-10-24 ENCOUNTER — APPOINTMENT (OUTPATIENT)
Dept: CARDIOLOGY | Facility: CLINIC | Age: 69
End: 2024-10-24
Payer: MEDICAID

## 2024-10-24 ENCOUNTER — NON-APPOINTMENT (OUTPATIENT)
Age: 69
End: 2024-10-24

## 2024-10-24 ENCOUNTER — APPOINTMENT (OUTPATIENT)
Dept: ELECTROPHYSIOLOGY | Facility: CLINIC | Age: 69
End: 2024-10-24

## 2024-10-24 VITALS
DIASTOLIC BLOOD PRESSURE: 76 MMHG | SYSTOLIC BLOOD PRESSURE: 137 MMHG | OXYGEN SATURATION: 96 % | HEIGHT: 64 IN | HEART RATE: 61 BPM

## 2024-10-24 VITALS — WEIGHT: 193 LBS | BODY MASS INDEX: 33.13 KG/M2

## 2024-10-24 DIAGNOSIS — I42.2 OTHER HYPERTROPHIC CARDIOMYOPATHY: ICD-10-CM

## 2024-10-24 DIAGNOSIS — I10 ESSENTIAL (PRIMARY) HYPERTENSION: ICD-10-CM

## 2024-10-24 DIAGNOSIS — I25.10 ATHEROSCLEROTIC HEART DISEASE OF NATIVE CORONARY ARTERY W/OUT ANGINA PECTORIS: ICD-10-CM

## 2024-10-24 DIAGNOSIS — E11.9 TYPE 2 DIABETES MELLITUS W/OUT COMPLICATIONS: ICD-10-CM

## 2024-10-24 PROCEDURE — 93000 ELECTROCARDIOGRAM COMPLETE: CPT

## 2024-10-24 PROCEDURE — 99215 OFFICE O/P EST HI 40 MIN: CPT

## 2024-11-25 PROCEDURE — 93248 EXT ECG>7D<15D REV&INTERPJ: CPT

## 2025-04-04 ENCOUNTER — APPOINTMENT (OUTPATIENT)
Dept: CARDIOLOGY | Facility: CLINIC | Age: 70
End: 2025-04-04
Payer: MEDICAID

## 2025-04-04 ENCOUNTER — NON-APPOINTMENT (OUTPATIENT)
Age: 70
End: 2025-04-04

## 2025-04-04 VITALS
WEIGHT: 193 LBS | HEIGHT: 64 IN | DIASTOLIC BLOOD PRESSURE: 74 MMHG | BODY MASS INDEX: 32.95 KG/M2 | OXYGEN SATURATION: 97 % | SYSTOLIC BLOOD PRESSURE: 156 MMHG | HEART RATE: 69 BPM

## 2025-04-04 PROCEDURE — 99214 OFFICE O/P EST MOD 30 MIN: CPT

## 2025-04-04 PROCEDURE — 93000 ELECTROCARDIOGRAM COMPLETE: CPT

## 2025-07-23 ENCOUNTER — NON-APPOINTMENT (OUTPATIENT)
Age: 70
End: 2025-07-23

## (undated) DEVICE — DRAPE TOWEL BLUE 17" X 24"

## (undated) DEVICE — DRAPE MEDIUM SHEET 44" X 70"

## (undated) DEVICE — DRSG CURITY GAUZE SPONGE 4 X 4" 12-PLY

## (undated) DEVICE — WARMING BLANKET LOWER ADULT

## (undated) DEVICE — DVC ASCOPE 4 RHINOLARYNGO SLIM DISP

## (undated) DEVICE — VENODYNE/SCD SLEEVE CALF MEDIUM